# Patient Record
Sex: MALE | Race: ASIAN | NOT HISPANIC OR LATINO | Employment: OTHER | URBAN - METROPOLITAN AREA
[De-identification: names, ages, dates, MRNs, and addresses within clinical notes are randomized per-mention and may not be internally consistent; named-entity substitution may affect disease eponyms.]

---

## 2021-04-27 ENCOUNTER — HOSPITAL ENCOUNTER (EMERGENCY)
Facility: HOSPITAL | Age: 66
Discharge: HOME | End: 2021-04-27
Attending: EMERGENCY MEDICINE
Payer: MEDICARE

## 2021-04-27 VITALS
OXYGEN SATURATION: 95 % | DIASTOLIC BLOOD PRESSURE: 67 MMHG | RESPIRATION RATE: 18 BRPM | HEART RATE: 83 BPM | TEMPERATURE: 99 F | HEIGHT: 64 IN | WEIGHT: 140 LBS | SYSTOLIC BLOOD PRESSURE: 139 MMHG | BODY MASS INDEX: 23.9 KG/M2

## 2021-04-27 DIAGNOSIS — S09.90XA INJURY OF HEAD, INITIAL ENCOUNTER: Primary | ICD-10-CM
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
## 2021-04-27 DIAGNOSIS — S01.81XA LACERATION OF FOREHEAD, INITIAL ENCOUNTER: ICD-10-CM

## 2021-04-27 PROCEDURE — 63600175 PHARM REV CODE 636 W HCPCS: Performed by: EMERGENCY MEDICINE

## 2021-04-27 PROCEDURE — 25000003 PHARM REV CODE 250: Performed by: EMERGENCY MEDICINE

## 2021-04-27 PROCEDURE — 90471 IMMUNIZATION ADMIN: CPT | Performed by: EMERGENCY MEDICINE

## 2021-04-27 PROCEDURE — 99284 EMERGENCY DEPT VISIT MOD MDM: CPT | Mod: 25

## 2021-04-27 PROCEDURE — 90715 TDAP VACCINE 7 YRS/> IM: CPT | Performed by: EMERGENCY MEDICINE

## 2021-04-27 PROCEDURE — 12013 RPR F/E/E/N/L/M 2.6-5.0 CM: CPT

## 2021-04-27 RX ORDER — LIDOCAINE HYDROCHLORIDE AND EPINEPHRINE 10; 10 MG/ML; UG/ML
10 INJECTION, SOLUTION INFILTRATION; PERINEURAL ONCE
Status: COMPLETED | OUTPATIENT
Start: 2021-04-27 | End: 2021-04-27

## 2021-04-27 RX ORDER — SIMVASTATIN 10 MG/1
TABLET, FILM COATED ORAL
COMMUNITY
Start: 2021-02-01 | End: 2024-01-23

## 2021-04-27 RX ORDER — ROSUVASTATIN CALCIUM 20 MG/1
20 TABLET, COATED ORAL NIGHTLY
COMMUNITY
Start: 2021-04-16 | End: 2024-01-23

## 2021-04-27 RX ORDER — ACETAMINOPHEN 500 MG
1000 TABLET ORAL
Status: COMPLETED | OUTPATIENT
Start: 2021-04-27 | End: 2021-04-27

## 2021-04-27 RX ORDER — ATORVASTATIN CALCIUM 20 MG/1
20 TABLET, FILM COATED ORAL DAILY
COMMUNITY
Start: 2021-04-21 | End: 2024-01-23

## 2021-04-27 RX ADMIN — ACETAMINOPHEN 1000 MG: 500 TABLET, FILM COATED ORAL at 08:04

## 2021-04-27 RX ADMIN — CLOSTRIDIUM TETANI TOXOID ANTIGEN (FORMALDEHYDE INACTIVATED), CORYNEBACTERIUM DIPHTHERIAE TOXOID ANTIGEN (FORMALDEHYDE INACTIVATED), BORDETELLA PERTUSSIS TOXOID ANTIGEN (GLUTARALDEHYDE INACTIVATED), BORDETELLA PERTUSSIS FILAMENTOUS HEMAGGLUTININ ANTIGEN (FORMALDEHYDE INACTIVATED), BORDETELLA PERTUSSIS PERTACTIN ANTIGEN, AND BORDETELLA PERTUSSIS FIMBRIAE 2/3 ANTIGEN 0.5 ML: 5; 2; 2.5; 5; 3; 5 INJECTION, SUSPENSION INTRAMUSCULAR at 11:04

## 2021-04-27 RX ADMIN — LIDOCAINE HYDROCHLORIDE,EPINEPHRINE BITARTRATE 10 ML: 10; .01 INJECTION, SOLUTION INFILTRATION; PERINEURAL at 10:04

## 2021-12-27 ENCOUNTER — HOSPITAL ENCOUNTER (EMERGENCY)
Facility: HOSPITAL | Age: 66
Discharge: HOME | End: 2021-12-27
Attending: EMERGENCY MEDICINE
Payer: MEDICARE

## 2021-12-27 VITALS
RESPIRATION RATE: 17 BRPM | HEART RATE: 98 BPM | DIASTOLIC BLOOD PRESSURE: 59 MMHG | SYSTOLIC BLOOD PRESSURE: 122 MMHG | BODY MASS INDEX: 23.22 KG/M2 | OXYGEN SATURATION: 100 % | WEIGHT: 136 LBS | TEMPERATURE: 98 F | HEIGHT: 64 IN

## 2021-12-27 DIAGNOSIS — S92.352A CLOSED FRACTURE OF BASE OF FIFTH METATARSAL BONE OF LEFT FOOT, INITIAL ENCOUNTER: Primary | ICD-10-CM

## 2021-12-27 DIAGNOSIS — M79.673 FOOT PAIN: ICD-10-CM

## 2021-12-27 DIAGNOSIS — S99.929A FOOT INJURY: ICD-10-CM

## 2021-12-27 DIAGNOSIS — M25.579 ANKLE PAIN: ICD-10-CM

## 2021-12-27 PROCEDURE — 99284 EMERGENCY DEPT VISIT MOD MDM: CPT | Mod: 25,ER

## 2021-12-27 PROCEDURE — 29515 APPLICATION SHORT LEG SPLINT: CPT | Mod: LT,ER

## 2021-12-27 RX ORDER — IBUPROFEN 600 MG/1
600 TABLET ORAL EVERY 6 HOURS PRN
Qty: 20 TABLET | Refills: 0 | Status: SHIPPED | OUTPATIENT
Start: 2021-12-27 | End: 2022-01-01

## 2021-12-27 RX ORDER — OXYCODONE AND ACETAMINOPHEN 5; 325 MG/1; MG/1
1 TABLET ORAL EVERY 6 HOURS PRN
Qty: 12 TABLET | Refills: 0 | Status: SHIPPED | OUTPATIENT
Start: 2021-12-27 | End: 2021-12-30

## 2021-12-28 NOTE — DISCHARGE INSTRUCTIONS

## 2021-12-28 NOTE — ED PROVIDER NOTES
Encounter Date: 12/27/2021    SCRIBE #1 NOTE: I, Jonathan Francis, am scribing for, and in the presence of,  JILLIAN Freed. I have scribed the following portions of the note - Other sections scribed: HPI, ROS, PE.       History     Chief Complaint   Patient presents with    Foot Injury     Left foot and ankle pain, bruising noted.  Fall off ladder- approx 2 rungs high onto foot, occurred 1 week ago, not getting better     Eduardo Jones is a 66 y.o. male who presents to the ED for evaluation of left foot pain s/p falling from the second rung of his ladder a week ago. Associated bruising. Reports itchy rash to his left foot onset 2 months ago. Denies discharge, numbness, lower leg pain, LOC, upper leg pain, or other associated symptoms. Pain aggravated when walking and reports difficulty ambulating secondary to pain. Denies hitting head. Denies taking any medications for symptoms. Denies seeing dermatologist for rash.       The history is provided by the patient. No  was used.     Review of patient's allergies indicates:  No Known Allergies  Past Medical History:   Diagnosis Date    Hyperlipidemia      Past Surgical History:   Procedure Laterality Date    ANKLE SURGERY Right     fracture     History reviewed. No pertinent family history.  Social History     Tobacco Use    Smoking status: Never Smoker    Smokeless tobacco: Never Used   Substance Use Topics    Alcohol use: Yes     Comment: sometimes    Drug use: Never     Review of Systems   Constitutional: Negative for chills, fatigue and fever.   HENT: Negative for congestion, ear pain, rhinorrhea and sore throat.    Eyes: Negative for pain, discharge and redness.   Respiratory: Negative for cough and shortness of breath.    Cardiovascular: Negative for chest pain.   Gastrointestinal: Negative for abdominal pain, anal bleeding, blood in stool, constipation, diarrhea, nausea, rectal pain and vomiting.   Genitourinary: Negative for dysuria  and hematuria.   Musculoskeletal: Positive for arthralgias. Negative for back pain, myalgias, neck pain and neck stiffness.   Skin: Positive for color change and rash.   Allergic/Immunologic: Negative for food allergies.   Neurological: Negative for dizziness, syncope, weakness, light-headedness, numbness and headaches.   Psychiatric/Behavioral: Negative for confusion.       Physical Exam     Initial Vitals [12/27/21 1657]   BP Pulse Resp Temp SpO2   (!) 131/57 100 19 98.2 °F (36.8 °C) 97 %      MAP       --         Physical Exam    Nursing note and vitals reviewed.  Constitutional: He appears well-developed.   HENT:   Head: Normocephalic and atraumatic.   Right Ear: External ear normal.   Left Ear: External ear normal.   Nose: Nose normal.   Mouth/Throat: Oropharynx is clear and moist.   Eyes: Conjunctivae and EOM are normal. Pupils are equal, round, and reactive to light.   Neck: Neck supple.   Normal range of motion.  Cardiovascular: Normal rate, regular rhythm, S1 normal, S2 normal and normal heart sounds. Exam reveals no gallop and no friction rub.    No murmur heard.  Pulmonary/Chest: Breath sounds normal. No respiratory distress. He has no wheezes. He has no rhonchi. He has no rales.   Abdominal: Abdomen is soft. There is no abdominal tenderness.   Musculoskeletal:         General: Normal range of motion.      Cervical back: Normal range of motion and neck supple.      Left foot: Tenderness present.      Comments: Bruising to bottom of the left foot with tenderness to the 4th and 5th metatarsal     Neurological: He is alert and oriented to person, place, and time.   Skin: Skin is warm and dry. Capillary refill takes less than 2 seconds. Bruising and rash noted.   Rash as imaged below.    Psychiatric: He has a normal mood and affect. His behavior is normal.         ED Course   Orthopedic Injury    Date/Time: 12/27/2021 9:26 PM  Performed by: Noelle Martin PA-C  Authorized by: Andrew Reddy MD      Location procedure was performed:  [facility] EMERGENCY DEPARTMENT  Injury:     Injury location:  Foot    Location details:  Left foot    Injury type:  Fracture    Fracture type: fifth metatarsal        Pre-procedure assessment:     Neurovascular status: Neurovascularly intact      Distal perfusion: normal      Neurological function: normal      Range of motion: reduced        Selections made in this section will also lock the Injury type section above.:     Immobilization:  Splint    Splint type:  Short leg    Supplies used:  Ortho-Glass  Post-procedure assessment:     Neurovascular status: Neurovascularly intact      Distal perfusion: normal      Neurological function: normal      Range of motion: splinted      Patient tolerance:  Patient tolerated the procedure well with no immediate complications      Labs Reviewed - No data to display       Imaging Results          X-Ray Foot Complete Left (Final result)  Result time 12/27/21 19:13:17    Final result by Lamont Hernandez MD (12/27/21 19:13:17)                 Impression:      Comminuted intra-articular fracture of the proximal base of the proximal and of the 5th metatarsal.    Bimalleolar soft tissue swelling, lateral greater than medial with no additional ankle or foot fractures.      Electronically signed by: Lamont Hernandez  Date:    12/27/2021  Time:    19:13             Narrative:    EXAMINATION:  XR ANKLE COMPLETE 3 VIEW LEFT; XR FOOT COMPLETE 3 VIEW LEFT    CLINICAL HISTORY:  Unspecified injury of unspecified foot, initial encounter    TECHNIQUE:  AP, lateral and oblique views of the left ankle were performed.    COMPARISON:  None    FINDINGS:  There is a comminuted fracture of the proximal base of the 5th metatarsal with intra-articular extension and mild retraction of the fragments.  The remainder of the bones appear intact.  Lisfranc joint appears maintained.    The ankle appears intact with bimalleolar soft tissue swelling, lateral greater than  medial.  The talar dome and plafond appear maintained.  The ankle mortise is intact.                               X-Ray Ankle Complete Left (Final result)  Result time 12/27/21 19:13:17    Final result by Lamont Hernandez MD (12/27/21 19:13:17)                 Impression:      Comminuted intra-articular fracture of the proximal base of the proximal and of the 5th metatarsal.    Bimalleolar soft tissue swelling, lateral greater than medial with no additional ankle or foot fractures.      Electronically signed by: Lamont Hernandez  Date:    12/27/2021  Time:    19:13             Narrative:    EXAMINATION:  XR ANKLE COMPLETE 3 VIEW LEFT; XR FOOT COMPLETE 3 VIEW LEFT    CLINICAL HISTORY:  Unspecified injury of unspecified foot, initial encounter    TECHNIQUE:  AP, lateral and oblique views of the left ankle were performed.    COMPARISON:  None    FINDINGS:  There is a comminuted fracture of the proximal base of the 5th metatarsal with intra-articular extension and mild retraction of the fragments.  The remainder of the bones appear intact.  Lisfranc joint appears maintained.    The ankle appears intact with bimalleolar soft tissue swelling, lateral greater than medial.  The talar dome and plafond appear maintained.  The ankle mortise is intact.                                 Medications - No data to display  Medical Decision Making:   History:   Old Medical Records: I decided to obtain old medical records.  Clinical Tests:   Radiological Study: Ordered and Reviewed  ED Management:  [age] year male presenting for traumatic left foot pain.  Exam above.  xray Remarkable for comminuted intra-articular fracture of the proximal base of the proximal 5th metatarsal. Independently reviewed and interpreted x-rays with Dr. Reddy. No neurovascular deficits. Splint applied per procedure note. Follow up with ortho in 1-2 days. Return to ER for worsening symptoms or as needed          Scribe Attestation:   Scribe #1: I  performed the above scribed service and the documentation accurately describes the services I performed. I attest to the accuracy of the note.               Scribe attestation: I, MEKHI Randle , personally performed the services described in this documentation.  All medical record entries made by the scribe were at my direction and in my presence.  I have reviewed the chart and agree that the record reflects my personal performance and is accurate and complete.    Clinical Impression:   Final diagnoses:  [S99.929A] Foot injury  [M79.673] Foot pain  [M25.579] Ankle pain  [S92.352A] Closed fracture of base of fifth metatarsal bone of left foot, initial encounter (Primary)          ED Disposition Condition    Discharge Stable        ED Prescriptions     Medication Sig Dispense Start Date End Date Auth. Provider    ibuprofen (ADVIL,MOTRIN) 600 MG tablet Take 1 tablet (600 mg total) by mouth every 6 (six) hours as needed for Pain. 20 tablet 12/27/2021 1/1/2022 Noelle Martin PA-C    oxyCODONE-acetaminophen (PERCOCET) 5-325 mg per tablet Take 1 tablet by mouth every 6 (six) hours as needed for Pain. 12 tablet 12/27/2021 12/30/2021 Noelle Martin PA-C        Follow-up Information     Follow up With Specialties Details Why Contact Info    Ada Lyons MD Family Medicine Schedule an appointment as soon as possible for a visit in 2 days for follow up [address] [facility] [address] [phone]      [facility] ED Emergency Medicine Go to  If symptoms worsen, As needed [address] [facility] [address] [phone]    Bijal Franco DPM (podiatry)  Schedule an appointment as soon as possible for a visit in 2 days For wound re-check [address] [address] [phone]    Jasper Nicholas MD Orthopedic Surgery, Hand Surgery Schedule an appointment as soon as possible for a visit  for orthopedics follow up [address] [facility] [address] [phone]       Mehrdad Raines MD Orthopedic Surgery Schedule an appointment as soon as possible for a visit  for follow up with orthopedics [address] [facility] [address]  [facility] [address]  [phone]             Noelle Martin PA-C  12/27/21 1909

## 2022-12-23 ENCOUNTER — PES CALL (OUTPATIENT)
Dept: ADMINISTRATIVE | Facility: CLINIC | Age: 67
End: 2022-12-23
Payer: MEDICARE

## 2023-09-07 ENCOUNTER — PATIENT OUTREACH (OUTPATIENT)
Dept: ADMINISTRATIVE | Facility: HOSPITAL | Age: 68
End: 2023-09-07
Payer: MEDICARE

## 2024-01-23 ENCOUNTER — HOSPITAL ENCOUNTER (OUTPATIENT)
Facility: HOSPITAL | Age: 69
Discharge: HOME | End: 2024-01-24
Attending: EMERGENCY MEDICINE | Admitting: HOSPITALIST
Payer: MEDICARE

## 2024-01-23 DIAGNOSIS — E87.6 HYPOKALEMIA: ICD-10-CM
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
## 2024-01-23 DIAGNOSIS — R07.9 CHEST PAIN: Primary | ICD-10-CM

## 2024-01-23 DIAGNOSIS — R09.02 HYPOXIA: ICD-10-CM

## 2024-01-23 DIAGNOSIS — M25.551 RIGHT HIP PAIN: ICD-10-CM

## 2024-01-23 DIAGNOSIS — R07.9 CHEST PAIN AT REST: ICD-10-CM

## 2024-01-23 PROBLEM — E78.5 HYPERLIPIDEMIA: Status: ACTIVE | Noted: 2024-01-23

## 2024-01-23 PROBLEM — J96.01 ACUTE RESPIRATORY FAILURE WITH HYPOXIA: Status: ACTIVE | Noted: 2024-01-23

## 2024-01-23 LAB
ALBUMIN SERPL BCP-MCNC: 3.7 G/DL (ref 3.5–5.2)
ALLENS TEST: ABNORMAL
ALP SERPL-CCNC: 48 U/L (ref 55–135)
ALT SERPL W/O P-5'-P-CCNC: 24 U/L (ref 10–44)
ANION GAP SERPL CALC-SCNC: 9 MMOL/L (ref 8–16)
ASCENDING AORTA: 3.04 CM
AST SERPL-CCNC: 26 U/L (ref 10–40)
AV INDEX (PROSTH): 0.95
AV MEAN GRADIENT: 4 MMHG
AV PEAK GRADIENT: 7 MMHG
AV VALVE AREA BY VELOCITY RATIO: 3.71 CM²
AV VALVE AREA: 3.82 CM²
AV VELOCITY RATIO: 0.93
BASOPHILS # BLD AUTO: 0.06 K/UL (ref 0–0.2)
BASOPHILS NFR BLD: 0.8 % (ref 0–1.9)
BILIRUB SERPL-MCNC: 0.9 MG/DL (ref 0.1–1)
BNP SERPL-MCNC: 32 PG/ML (ref 0–99)
BSA FOR ECHO PROCEDURE: 1.69 M2
BUN SERPL-MCNC: 13 MG/DL (ref 8–23)
CALCIUM SERPL-MCNC: 8.4 MG/DL (ref 8.7–10.5)
CHLORIDE SERPL-SCNC: 107 MMOL/L (ref 95–110)
CHOLEST SERPL-MCNC: 168 MG/DL (ref 120–199)
CHOLEST/HDLC SERPL: 2.1 {RATIO} (ref 2–5)
CO2 SERPL-SCNC: 23 MMOL/L (ref 23–29)
CREAT SERPL-MCNC: 0.9 MG/DL (ref 0.5–1.4)
CTP QC/QA: YES
CTP QC/QA: YES
CV ECHO LV RWT: 0.48 CM
CV STRESS BASE HR: 86 BPM
D DIMER PPP IA.FEU-MCNC: 0.35 MG/L FEU
DELSYS: ABNORMAL
DIASTOLIC BLOOD PRESSURE: 60 MMHG
DIFFERENTIAL METHOD BLD: ABNORMAL
DOP CALC AO PEAK VEL: 1.35 M/S
DOP CALC AO VTI: 27.1 CM
DOP CALC LVOT AREA: 4 CM2
DOP CALC LVOT DIAMETER: 2.26 CM
DOP CALC LVOT PEAK VEL: 1.25 M/S
DOP CALC LVOT STROKE VOLUME: 103.44 CM3
DOP CALCLVOT PEAK VEL VTI: 25.8 CM
E WAVE DECELERATION TIME: 157.52 MSEC
E/A RATIO: 0.88
E/E' RATIO: 9.47 M/S
ECHO LV POSTERIOR WALL: 1.04 CM (ref 0.6–1.1)
EOSINOPHIL # BLD AUTO: 0.4 K/UL (ref 0–0.5)
EOSINOPHIL NFR BLD: 4.9 % (ref 0–8)
ERYTHROCYTE [DISTWIDTH] IN BLOOD BY AUTOMATED COUNT: 13.2 % (ref 11.5–14.5)
EST. GFR  (NO RACE VARIABLE): >60 ML/MIN/1.73 M^2
FRACTIONAL SHORTENING: 38 % (ref 28–44)
GLUCOSE SERPL-MCNC: 104 MG/DL (ref 70–110)
HCO3 UR-SCNC: 22 MMOL/L (ref 24–28)
HCT VFR BLD AUTO: 45.1 % (ref 40–54)
HDLC SERPL-MCNC: 81 MG/DL (ref 40–75)
HDLC SERPL: 48.2 % (ref 20–50)
HGB BLD-MCNC: 15 G/DL (ref 14–18)
IMM GRANULOCYTES # BLD AUTO: 0.04 K/UL (ref 0–0.04)
IMM GRANULOCYTES NFR BLD AUTO: 0.5 % (ref 0–0.5)
INTERVENTRICULAR SEPTUM: 1.02 CM (ref 0.6–1.1)
IVC DIAMETER: 1.48 CM
IVRT: 95.15 MSEC
LA MAJOR: 4.93 CM
LA MINOR: 4.87 CM
LA WIDTH: 3.4 CM
LDLC SERPL CALC-MCNC: 38.2 MG/DL (ref 63–159)
LEFT ATRIUM SIZE: 3.12 CM
LEFT ATRIUM VOLUME INDEX: 26.3 ML/M2
LEFT ATRIUM VOLUME: 44.18 CM3
LEFT INTERNAL DIMENSION IN SYSTOLE: 2.7 CM (ref 2.1–4)
LEFT VENTRICLE DIASTOLIC VOLUME INDEX: 51.13 ML/M2
LEFT VENTRICLE DIASTOLIC VOLUME: 85.89 ML
LEFT VENTRICLE MASS INDEX: 90 G/M2
LEFT VENTRICLE SYSTOLIC VOLUME INDEX: 16.1 ML/M2
LEFT VENTRICLE SYSTOLIC VOLUME: 27.03 ML
LEFT VENTRICULAR INTERNAL DIMENSION IN DIASTOLE: 4.36 CM (ref 3.5–6)
LEFT VENTRICULAR MASS: 151.8 G
LV LATERAL E/E' RATIO: 8.88 M/S
LV SEPTAL E/E' RATIO: 10.14 M/S
LVOT MG: 3.64 MMHG
LVOT MV: 0.9 CM/S
LYMPHOCYTES # BLD AUTO: 2.6 K/UL (ref 1–4.8)
LYMPHOCYTES NFR BLD: 33.7 % (ref 18–48)
MAGNESIUM SERPL-MCNC: 2 MG/DL (ref 1.6–2.6)
MCH RBC QN AUTO: 31.4 PG (ref 27–31)
MCHC RBC AUTO-ENTMCNC: 33.3 G/DL (ref 32–36)
MCV RBC AUTO: 95 FL (ref 82–98)
MONOCYTES # BLD AUTO: 0.5 K/UL (ref 0.3–1)
MONOCYTES NFR BLD: 6.8 % (ref 4–15)
MV PEAK A VEL: 0.81 M/S
MV PEAK E VEL: 0.71 M/S
MV STENOSIS PRESSURE HALF TIME: 45.68 MS
MV VALVE AREA P 1/2 METHOD: 4.82 CM2
NEUTROPHILS # BLD AUTO: 4.1 K/UL (ref 1.8–7.7)
NEUTROPHILS NFR BLD: 53.3 % (ref 38–73)
NONHDLC SERPL-MCNC: 87 MG/DL
NRBC BLD-RTO: 0 /100 WBC
NUC STRESS EJECTION FRACTION: 79 %
OHS CV CPX 85 PERCENT MAX PREDICTED HEART RATE MALE: 128
OHS CV CPX MAX PREDICTED HEART RATE: 151
OHS CV CPX PATIENT IS FEMALE: 0
OHS CV CPX PATIENT IS MALE: 1
OHS CV CPX PEAK DIASTOLIC BLOOD PRESSURE: 55 MMHG
OHS CV CPX PEAK HEAR RATE: 107 BPM
OHS CV CPX PEAK RATE PRESSURE PRODUCT: NORMAL
OHS CV CPX PEAK SYSTOLIC BLOOD PRESSURE: 148 MMHG
OHS CV CPX PERCENT MAX PREDICTED HEART RATE ACHIEVED: 71
OHS CV CPX RATE PRESSURE PRODUCT PRESENTING: NORMAL
PCO2 BLDA: 34.6 MMHG (ref 35–45)
PH SMN: 7.41 [PH] (ref 7.35–7.45)
PHOSPHATE SERPL-MCNC: 3.1 MG/DL (ref 2.7–4.5)
PISA TR MAX VEL: 2.39 M/S
PLATELET # BLD AUTO: 135 K/UL (ref 150–450)
PMV BLD AUTO: 9.6 FL (ref 9.2–12.9)
PO2 BLDA: 31 MMHG (ref 40–60)
POC BE: -2 MMOL/L
POC MOLECULAR INFLUENZA A AGN: NEGATIVE
POC MOLECULAR INFLUENZA B AGN: NEGATIVE
POC SATURATED O2: 61 % (ref 95–100)
POC TCO2: 23 MMOL/L (ref 24–29)
POTASSIUM SERPL-SCNC: 3.3 MMOL/L (ref 3.5–5.1)
PROT SERPL-MCNC: 7.1 G/DL (ref 6–8.4)
PULM VEIN S/D RATIO: 1.36
PV PEAK D VEL: 0.45 M/S
PV PEAK GRADIENT: 4 MMHG
PV PEAK S VEL: 0.61 M/S
PV PEAK VELOCITY: 1.05 M/S
RA MAJOR: 4.6 CM
RA PRESSURE ESTIMATED: 3 MMHG
RA WIDTH: 2.8 CM
RBC # BLD AUTO: 4.77 M/UL (ref 4.6–6.2)
RIGHT VENTRICULAR END-DIASTOLIC DIMENSION: 3.01 CM
RV TB RVSP: 5 MMHG
RV TISSUE DOPPLER FREE WALL SYSTOLIC VELOCITY 1 (APICAL 4 CHAMBER VIEW): 18.76 CM/S
SAMPLE: ABNORMAL
SARS-COV-2 RDRP RESP QL NAA+PROBE: NEGATIVE
SINUS: 3.44 CM
SITE: ABNORMAL
SODIUM SERPL-SCNC: 139 MMOL/L (ref 136–145)
STJ: 2.72 CM
SYSTOLIC BLOOD PRESSURE: 146 MMHG
T4 FREE SERPL-MCNC: 0.92 NG/DL (ref 0.71–1.51)
TDI LATERAL: 0.08 M/S
TDI SEPTAL: 0.07 M/S
TDI: 0.08 M/S
TR MAX PG: 23 MMHG
TRICUSPID ANNULAR PLANE SYSTOLIC EXCURSION: 2.94 CM
TRIGL SERPL-MCNC: 244 MG/DL (ref 30–150)
TROPONIN I SERPL DL<=0.01 NG/ML-MCNC: 0.01 NG/ML (ref 0–0.03)
TROPONIN I SERPL DL<=0.01 NG/ML-MCNC: 0.01 NG/ML (ref 0–0.03)
TROPONIN I SERPL DL<=0.01 NG/ML-MCNC: <0.006 NG/ML (ref 0–0.03)
TSH SERPL DL<=0.005 MIU/L-ACNC: 4.38 UIU/ML (ref 0.4–4)
TV PEAK GRADIENT: 1 MMHG
TV REST PULMONARY ARTERY PRESSURE: 26 MMHG
WBC # BLD AUTO: 7.75 K/UL (ref 3.9–12.7)
Z-SCORE OF LEFT VENTRICULAR DIMENSION IN END DIASTOLE: -0.72
Z-SCORE OF LEFT VENTRICULAR DIMENSION IN END SYSTOLE: -0.58

## 2024-01-23 PROCEDURE — 99204 OFFICE O/P NEW MOD 45 MIN: CPT | Mod: ,,, | Performed by: INTERNAL MEDICINE

## 2024-01-23 PROCEDURE — 96375 TX/PRO/DX INJ NEW DRUG ADDON: CPT

## 2024-01-23 PROCEDURE — C9113 INJ PANTOPRAZOLE SODIUM, VIA: HCPCS | Performed by: PHYSICIAN ASSISTANT

## 2024-01-23 PROCEDURE — 83880 ASSAY OF NATRIURETIC PEPTIDE: CPT | Performed by: PHYSICIAN ASSISTANT

## 2024-01-23 PROCEDURE — 63600175 PHARM REV CODE 636 W HCPCS: Performed by: EMERGENCY MEDICINE

## 2024-01-23 PROCEDURE — 85025 COMPLETE CBC W/AUTO DIFF WBC: CPT | Performed by: PHYSICIAN ASSISTANT

## 2024-01-23 PROCEDURE — 85379 FIBRIN DEGRADATION QUANT: CPT | Performed by: PHYSICIAN ASSISTANT

## 2024-01-23 PROCEDURE — 84100 ASSAY OF PHOSPHORUS: CPT | Performed by: PHYSICIAN ASSISTANT

## 2024-01-23 PROCEDURE — 96374 THER/PROPH/DIAG INJ IV PUSH: CPT

## 2024-01-23 PROCEDURE — G0378 HOSPITAL OBSERVATION PER HR: HCPCS

## 2024-01-23 PROCEDURE — 84484 ASSAY OF TROPONIN QUANT: CPT | Mod: 91 | Performed by: NURSE PRACTITIONER

## 2024-01-23 PROCEDURE — 84439 ASSAY OF FREE THYROXINE: CPT | Performed by: PHYSICIAN ASSISTANT

## 2024-01-23 PROCEDURE — 94640 AIRWAY INHALATION TREATMENT: CPT

## 2024-01-23 PROCEDURE — 25000003 PHARM REV CODE 250: Performed by: PHYSICIAN ASSISTANT

## 2024-01-23 PROCEDURE — 27000221 HC OXYGEN, UP TO 24 HOURS

## 2024-01-23 PROCEDURE — 99285 EMERGENCY DEPT VISIT HI MDM: CPT | Mod: 25

## 2024-01-23 PROCEDURE — 25000242 PHARM REV CODE 250 ALT 637 W/ HCPCS: Performed by: EMERGENCY MEDICINE

## 2024-01-23 PROCEDURE — 63600175 PHARM REV CODE 636 W HCPCS: Performed by: INTERNAL MEDICINE

## 2024-01-23 PROCEDURE — 25000242 PHARM REV CODE 250 ALT 637 W/ HCPCS: Performed by: NURSE PRACTITIONER

## 2024-01-23 PROCEDURE — 84484 ASSAY OF TROPONIN QUANT: CPT | Performed by: PHYSICIAN ASSISTANT

## 2024-01-23 PROCEDURE — 93005 ELECTROCARDIOGRAM TRACING: CPT

## 2024-01-23 PROCEDURE — 82803 BLOOD GASES ANY COMBINATION: CPT

## 2024-01-23 PROCEDURE — 63600175 PHARM REV CODE 636 W HCPCS: Performed by: PHYSICIAN ASSISTANT

## 2024-01-23 PROCEDURE — 99900035 HC TECH TIME PER 15 MIN (STAT)

## 2024-01-23 PROCEDURE — 84443 ASSAY THYROID STIM HORMONE: CPT | Performed by: PHYSICIAN ASSISTANT

## 2024-01-23 PROCEDURE — 93010 ELECTROCARDIOGRAM REPORT: CPT | Mod: ,,, | Performed by: INTERNAL MEDICINE

## 2024-01-23 PROCEDURE — 63600175 PHARM REV CODE 636 W HCPCS: Performed by: NURSE PRACTITIONER

## 2024-01-23 PROCEDURE — 87502 INFLUENZA DNA AMP PROBE: CPT

## 2024-01-23 PROCEDURE — 94760 N-INVAS EAR/PLS OXIMETRY 1: CPT | Mod: XB

## 2024-01-23 PROCEDURE — 83735 ASSAY OF MAGNESIUM: CPT | Performed by: PHYSICIAN ASSISTANT

## 2024-01-23 PROCEDURE — 96376 TX/PRO/DX INJ SAME DRUG ADON: CPT | Mod: 59

## 2024-01-23 PROCEDURE — C9113 INJ PANTOPRAZOLE SODIUM, VIA: HCPCS | Performed by: NURSE PRACTITIONER

## 2024-01-23 PROCEDURE — 80061 LIPID PANEL: CPT | Performed by: PHYSICIAN ASSISTANT

## 2024-01-23 PROCEDURE — 80053 COMPREHEN METABOLIC PANEL: CPT | Performed by: PHYSICIAN ASSISTANT

## 2024-01-23 PROCEDURE — 87635 SARS-COV-2 COVID-19 AMP PRB: CPT | Performed by: PHYSICIAN ASSISTANT

## 2024-01-23 PROCEDURE — 94644 CONT INHLJ TX 1ST HOUR: CPT

## 2024-01-23 RX ORDER — IPRATROPIUM BROMIDE AND ALBUTEROL SULFATE 2.5; .5 MG/3ML; MG/3ML
3 SOLUTION RESPIRATORY (INHALATION)
Status: DISCONTINUED | OUTPATIENT
Start: 2024-01-23 | End: 2024-01-23

## 2024-01-23 RX ORDER — CHLORHEXIDINE GLUCONATE ORAL RINSE 1.2 MG/ML
15 SOLUTION DENTAL 2 TIMES DAILY
COMMUNITY
Start: 2024-01-03

## 2024-01-23 RX ORDER — ASPIRIN 81 MG/1
81 TABLET ORAL DAILY
Status: DISCONTINUED | OUTPATIENT
Start: 2024-01-23 | End: 2024-01-23

## 2024-01-23 RX ORDER — SODIUM CHLORIDE 0.9 % (FLUSH) 0.9 %
10 SYRINGE (ML) INJECTION
Status: DISCONTINUED | OUTPATIENT
Start: 2024-01-23 | End: 2024-01-24 | Stop reason: HOSPADM

## 2024-01-23 RX ORDER — ALLOPURINOL 100 MG/1
300 TABLET ORAL DAILY
Status: DISCONTINUED | OUTPATIENT
Start: 2024-01-24 | End: 2024-01-24 | Stop reason: HOSPADM

## 2024-01-23 RX ORDER — ACETAMINOPHEN 325 MG/1
650 TABLET ORAL EVERY 4 HOURS PRN
Status: DISCONTINUED | OUTPATIENT
Start: 2024-01-23 | End: 2024-01-24 | Stop reason: HOSPADM

## 2024-01-23 RX ORDER — ATORVASTATIN CALCIUM 20 MG/1
20 TABLET, FILM COATED ORAL DAILY
COMMUNITY

## 2024-01-23 RX ORDER — FLUTICASONE PROPIONATE 50 MCG
2 SPRAY, SUSPENSION (ML) NASAL DAILY PRN
COMMUNITY
Start: 2023-11-07

## 2024-01-23 RX ORDER — NAPROXEN 500 MG/1
500 TABLET ORAL 2 TIMES DAILY PRN
Status: ON HOLD | COMMUNITY
Start: 2023-11-06 | End: 2024-01-24 | Stop reason: HOSPADM

## 2024-01-23 RX ORDER — PANTOPRAZOLE SODIUM 40 MG/10ML
40 INJECTION, POWDER, LYOPHILIZED, FOR SOLUTION INTRAVENOUS
Status: COMPLETED | OUTPATIENT
Start: 2024-01-23 | End: 2024-01-23

## 2024-01-23 RX ORDER — ICOSAPENT ETHYL 1000 MG/1
2 CAPSULE ORAL 2 TIMES DAILY
COMMUNITY
Start: 2023-10-20

## 2024-01-23 RX ORDER — ALUMINUM HYDROXIDE, MAGNESIUM HYDROXIDE, AND SIMETHICONE 1200; 120; 1200 MG/30ML; MG/30ML; MG/30ML
30 SUSPENSION ORAL ONCE
Status: COMPLETED | OUTPATIENT
Start: 2024-01-23 | End: 2024-01-23

## 2024-01-23 RX ORDER — IPRATROPIUM BROMIDE AND ALBUTEROL SULFATE 2.5; .5 MG/3ML; MG/3ML
3 SOLUTION RESPIRATORY (INHALATION) EVERY 4 HOURS PRN
Status: DISCONTINUED | OUTPATIENT
Start: 2024-01-23 | End: 2024-01-24 | Stop reason: HOSPADM

## 2024-01-23 RX ORDER — ASPIRIN 81 MG/1
81 TABLET ORAL DAILY
Status: DISCONTINUED | OUTPATIENT
Start: 2024-01-24 | End: 2024-01-24 | Stop reason: HOSPADM

## 2024-01-23 RX ORDER — METHOCARBAMOL 750 MG/1
750 TABLET, FILM COATED ORAL
Status: COMPLETED | OUTPATIENT
Start: 2024-01-23 | End: 2024-01-23

## 2024-01-23 RX ORDER — CLOTRIMAZOLE AND BETAMETHASONE DIPROPIONATE 10; .64 MG/G; MG/G
CREAM TOPICAL 2 TIMES DAILY PRN
COMMUNITY
Start: 2023-11-15

## 2024-01-23 RX ORDER — ALBUTEROL SULFATE 2.5 MG/.5ML
10 SOLUTION RESPIRATORY (INHALATION)
Status: COMPLETED | OUTPATIENT
Start: 2024-01-23 | End: 2024-01-23

## 2024-01-23 RX ORDER — IPRATROPIUM BROMIDE AND ALBUTEROL SULFATE 2.5; .5 MG/3ML; MG/3ML
3 SOLUTION RESPIRATORY (INHALATION) EVERY 4 HOURS
Status: DISCONTINUED | OUTPATIENT
Start: 2024-01-23 | End: 2024-01-23

## 2024-01-23 RX ORDER — ATORVASTATIN CALCIUM 40 MG/1
40 TABLET, FILM COATED ORAL NIGHTLY
Status: DISCONTINUED | OUTPATIENT
Start: 2024-01-23 | End: 2024-01-23

## 2024-01-23 RX ORDER — TRIAMCINOLONE ACETONIDE 1 MG/G
PASTE DENTAL 2 TIMES DAILY
COMMUNITY
Start: 2023-10-09 | End: 2024-01-23

## 2024-01-23 RX ORDER — IPRATROPIUM BROMIDE 0.5 MG/2.5ML
1 SOLUTION RESPIRATORY (INHALATION)
Status: COMPLETED | OUTPATIENT
Start: 2024-01-23 | End: 2024-01-23

## 2024-01-23 RX ORDER — BENZONATATE 100 MG/1
100-200 CAPSULE ORAL 3 TIMES DAILY PRN
COMMUNITY
Start: 2023-12-19 | End: 2024-01-23

## 2024-01-23 RX ORDER — DEXAMETHASONE SODIUM PHOSPHATE 4 MG/ML
8 INJECTION, SOLUTION INTRA-ARTICULAR; INTRALESIONAL; INTRAMUSCULAR; INTRAVENOUS; SOFT TISSUE
Status: COMPLETED | OUTPATIENT
Start: 2024-01-23 | End: 2024-01-23

## 2024-01-23 RX ORDER — ALLOPURINOL 300 MG/1
300 TABLET ORAL DAILY
COMMUNITY
Start: 2024-01-07

## 2024-01-23 RX ORDER — ASPIRIN 325 MG
325 TABLET ORAL
Status: COMPLETED | OUTPATIENT
Start: 2024-01-23 | End: 2024-01-23

## 2024-01-23 RX ORDER — REGADENOSON 0.08 MG/ML
0.4 INJECTION, SOLUTION INTRAVENOUS ONCE
Status: COMPLETED | OUTPATIENT
Start: 2024-01-23 | End: 2024-01-23

## 2024-01-23 RX ORDER — PANTOPRAZOLE SODIUM 40 MG/10ML
40 INJECTION, POWDER, LYOPHILIZED, FOR SOLUTION INTRAVENOUS DAILY
Status: DISCONTINUED | OUTPATIENT
Start: 2024-01-23 | End: 2024-01-24 | Stop reason: HOSPADM

## 2024-01-23 RX ADMIN — IPRATROPIUM BROMIDE 1 MG: 0.5 SOLUTION RESPIRATORY (INHALATION) at 07:01

## 2024-01-23 RX ADMIN — ALUMINUM HYDROXIDE, MAGNESIUM HYDROXIDE, AND DIMETHICONE 30 ML: 200; 20; 200 SUSPENSION ORAL at 06:01

## 2024-01-23 RX ADMIN — PANTOPRAZOLE SODIUM 40 MG: 40 INJECTION, POWDER, FOR SOLUTION INTRAVENOUS at 06:01

## 2024-01-23 RX ADMIN — PANTOPRAZOLE SODIUM 40 MG: 40 INJECTION, POWDER, FOR SOLUTION INTRAVENOUS at 07:01

## 2024-01-23 RX ADMIN — ALBUTEROL SULFATE 10 MG: 2.5 SOLUTION RESPIRATORY (INHALATION) at 07:01

## 2024-01-23 RX ADMIN — ASPIRIN 325 MG ORAL TABLET 325 MG: 325 PILL ORAL at 05:01

## 2024-01-23 RX ADMIN — POTASSIUM BICARBONATE 50 MEQ: 977.5 TABLET, EFFERVESCENT ORAL at 06:01

## 2024-01-23 RX ADMIN — METHOCARBAMOL 750 MG: 750 TABLET ORAL at 05:01

## 2024-01-23 RX ADMIN — DEXAMETHASONE SODIUM PHOSPHATE 8 MG: 4 INJECTION INTRA-ARTICULAR; INTRALESIONAL; INTRAMUSCULAR; INTRAVENOUS; SOFT TISSUE at 07:01

## 2024-01-23 RX ADMIN — IPRATROPIUM BROMIDE AND ALBUTEROL SULFATE 3 ML: 2.5; .5 SOLUTION RESPIRATORY (INHALATION) at 11:01

## 2024-01-23 RX ADMIN — REGADENOSON 0.4 MG: 0.08 INJECTION, SOLUTION INTRAVENOUS at 10:01

## 2024-01-23 NOTE — SUBJECTIVE & OBJECTIVE
Past Medical History:   Diagnosis Date    Hyperlipidemia        Past Surgical History:   Procedure Laterality Date    ANKLE SURGERY Right     fracture       Review of patient's allergies indicates:  No Known Allergies    No current facility-administered medications on file prior to encounter.     Current Outpatient Medications on File Prior to Encounter   Medication Sig    allopurinoL (ZYLOPRIM) 300 MG tablet Take 300 mg by mouth once daily.    atorvastatin (LIPITOR) 20 MG tablet Take 20 mg by mouth once daily.    chlorhexidine (PERIDEX) 0.12 % solution 15 mLs 2 (two) times daily.    clotrimazole-betamethasone 1-0.05% (LOTRISONE) cream Apply topically 2 (two) times daily as needed.    fluticasone propionate (FLONASE) 50 mcg/actuation nasal spray 2 sprays by Each Nostril route daily as needed.    naproxen (NAPROSYN) 500 MG tablet Take 500 mg by mouth 2 (two) times daily as needed.    VASCEPA 1 gram Cap Take 2 capsules by mouth 2 (two) times daily.    [DISCONTINUED] atorvastatin (LIPITOR) 20 MG tablet Take 20 mg by mouth once daily.    [DISCONTINUED] benzonatate (TESSALON) 100 MG capsule Take 100-200 mg by mouth 3 (three) times daily as needed.    [DISCONTINUED] rosuvastatin (CRESTOR) 20 MG tablet Take 20 mg by mouth nightly.    [DISCONTINUED] simvastatin (ZOCOR) 10 MG tablet SMARTSI Tablet(s) By Mouth Every Evening    [DISCONTINUED] triamcinolone acetonide 0.1% (KENALOG) 0.1 % paste 2 (two) times daily.     Family History    None       Tobacco Use    Smoking status: Never    Smokeless tobacco: Never   Substance and Sexual Activity    Alcohol use: Yes     Comment: sometimes    Drug use: Never    Sexual activity: Not on file     Review of Systems   Constitutional:  Negative for activity change, appetite change, chills, diaphoresis and fatigue.   HENT: Negative.     Respiratory:  Positive for chest tightness and shortness of breath. Negative for cough, choking, wheezing and stridor.    Cardiovascular:  Positive for  Take 10 mg (2 Pills) of oxycodone every 4 hours for pain  Set an alarm and have someone wake you to make sure you stay on stop of your medication   You should have enough pain medication to get you through to Monday,  Your schedule is as follows: 4:30PM, 7:30 PM  10:30 PM, (Sunday)1:30 AM, 4:30 AM, 7:30 AM, 10:30 AM, 1:30PM:4:30PM, etc  Take colace or senna for constipation  Call Orthopedics on Monday for follow up  Ice the affected area as tolerated chest pain.   Gastrointestinal: Negative.    Genitourinary: Negative.    Musculoskeletal: Negative.    Skin: Negative.    Psychiatric/Behavioral: Negative.     Objective:     Vital Signs (Most Recent):  Temp: 98.1 °F (36.7 °C) (01/23/24 0343)  Pulse: 91 (01/23/24 0832)  Resp: 19 (01/23/24 0832)  BP: 104/63 (01/23/24 0832)  SpO2: 95 % (01/23/24 0830) Vital Signs (24h Range):  Temp:  [98.1 °F (36.7 °C)] 98.1 °F (36.7 °C)  Pulse:  [72-91] 91  Resp:  [18-22] 19  SpO2:  [89 %-96 %] 95 %  BP: (104-142)/(55-67) 104/63     Weight: 63.5 kg (140 lb)  Body mass index is 24.03 kg/m².     Physical Exam  Constitutional:       Appearance: Normal appearance. He is not ill-appearing.   HENT:      Head: Atraumatic.      Nose: Nose normal.   Cardiovascular:      Rate and Rhythm: Normal rate and regular rhythm.   Pulmonary:      Comments: diminished  Abdominal:      Palpations: Abdomen is soft.   Musculoskeletal:      Cervical back: Normal range of motion.      Right lower leg: No edema.      Left lower leg: No edema.   Skin:     General: Skin is warm and dry.   Neurological:      General: No focal deficit present.      Mental Status: He is alert and oriented to person, place, and time.            Significant Labs: All pertinent labs within the past 24 hours have been reviewed.    Significant Imaging: I have reviewed all pertinent imaging results/findings within the past 24 hours.

## 2024-01-23 NOTE — HPI
HPI: Eduardo Jones is a 68-year-old male with significant history for gout, hyperlipidemia, remote smoker quit 20 years ago -40 year pack smoker who drove himself to the ED for non radiating mid sternal chest tightness associated with shortness of breath and palpitation heart rate 120 last night.  For the past 7 days patient has been experience midsternal chest tightness the last about a minute and resolved with rest.  Symptoms lasted longer and more intense last night; thus, drove himself to the ED. he fell in on his right hip while working in the garden yesterday.  He denies hitting his chest or head.  No previous history of MI or stroke.  No family history of MI or stroke.  He has not clear if activity worsened symptoms but did state symptoms resolved with rest. Denies wheezing, recent URI, headaches, dizziness, change in vision,  diaphoresis, orthopnea, PND, abdominal pain, nausea, vomiting, or extremities weakness/numbness. Denies recent sick contacts, travel, or injury. No new physical limitation in the past month.      In ED EKG normal sinus rhythm . Troponin negative x 2. BNP normal  Noted to be 85-88 on room air with activity.  States Right hip and lower back pain.   D dimer negative.  X ray right hip no fractures.  Received maloxx, dexamethasone, albuterol/atrovent neb , protonix in ED.     Currently denies CP or SOB  Denies prior CAD

## 2024-01-23 NOTE — SUBJECTIVE & OBJECTIVE
Past Medical History:   Diagnosis Date    Hyperlipidemia        Past Surgical History:   Procedure Laterality Date    ANKLE SURGERY Right     fracture       Review of patient's allergies indicates:  No Known Allergies    No current facility-administered medications on file prior to encounter.     Current Outpatient Medications on File Prior to Encounter   Medication Sig    allopurinoL (ZYLOPRIM) 300 MG tablet Take 300 mg by mouth once daily.    atorvastatin (LIPITOR) 20 MG tablet Take 20 mg by mouth once daily.    chlorhexidine (PERIDEX) 0.12 % solution 15 mLs 2 (two) times daily.    clotrimazole-betamethasone 1-0.05% (LOTRISONE) cream Apply topically 2 (two) times daily as needed.    fluticasone propionate (FLONASE) 50 mcg/actuation nasal spray 2 sprays by Each Nostril route daily as needed.    naproxen (NAPROSYN) 500 MG tablet Take 500 mg by mouth 2 (two) times daily as needed.    VASCEPA 1 gram Cap Take 2 capsules by mouth 2 (two) times daily.    [DISCONTINUED] atorvastatin (LIPITOR) 20 MG tablet Take 20 mg by mouth once daily.    [DISCONTINUED] benzonatate (TESSALON) 100 MG capsule Take 100-200 mg by mouth 3 (three) times daily as needed.    [DISCONTINUED] rosuvastatin (CRESTOR) 20 MG tablet Take 20 mg by mouth nightly.    [DISCONTINUED] simvastatin (ZOCOR) 10 MG tablet SMARTSI Tablet(s) By Mouth Every Evening    [DISCONTINUED] triamcinolone acetonide 0.1% (KENALOG) 0.1 % paste 2 (two) times daily.     Family History    None       Tobacco Use    Smoking status: Never    Smokeless tobacco: Never   Substance and Sexual Activity    Alcohol use: Yes     Comment: sometimes    Drug use: Never    Sexual activity: Not on file     Review of Systems   Constitutional: Negative for decreased appetite.   HENT:  Negative for ear discharge.    Eyes:  Negative for blurred vision.   Endocrine: Negative for polyphagia.   Skin:  Negative for nail changes.   Genitourinary:  Negative for bladder incontinence.   Neurological:   Negative for aphonia.   Psychiatric/Behavioral:  Negative for hallucinations.    Allergic/Immunologic: Negative for hives.     Objective:     Vital Signs (Most Recent):  Temp: 98.1 °F (36.7 °C) (01/23/24 0343)  Pulse: 91 (01/23/24 0832)  Resp: 19 (01/23/24 0832)  BP: 104/63 (01/23/24 0832)  SpO2: 95 % (01/23/24 0830) Vital Signs (24h Range):  Temp:  [98.1 °F (36.7 °C)] 98.1 °F (36.7 °C)  Pulse:  [72-91] 91  Resp:  [18-22] 19  SpO2:  [89 %-96 %] 95 %  BP: (104-142)/(55-67) 104/63     Weight: 63.5 kg (140 lb)  Body mass index is 24.03 kg/m².    SpO2: 95 %       No intake or output data in the 24 hours ending 01/23/24 0955    Lines/Drains/Airways       Peripheral Intravenous Line  Duration                  Peripheral IV - Single Lumen 01/23/24 0415 20 G;1 in Anterior;Left;Proximal Forearm <1 day                     Physical Exam  Constitutional:       Appearance: He is well-developed.   HENT:      Head: Normocephalic and atraumatic.   Eyes:      Conjunctiva/sclera: Conjunctivae normal.      Pupils: Pupils are equal, round, and reactive to light.   Cardiovascular:      Rate and Rhythm: Normal rate.      Pulses: Intact distal pulses.      Heart sounds: Normal heart sounds.   Pulmonary:      Effort: Pulmonary effort is normal.      Breath sounds: Normal breath sounds.   Abdominal:      General: Bowel sounds are normal.      Palpations: Abdomen is soft.   Musculoskeletal:         General: Normal range of motion.      Cervical back: Normal range of motion and neck supple.   Skin:     General: Skin is warm and dry.   Neurological:      Mental Status: He is alert and oriented to person, place, and time.          Significant Labs: All pertinent lab results from the last 24 hours have been reviewed.    Significant Imaging: Echocardiogram: 2D echo with color flow doppler: No results found for this or any previous visit.

## 2024-01-23 NOTE — ED NOTES
Report received from MOR Castelan. Pt ambulated to restroom independently. Once back in room, cardiac monitoring, pulse ox and automated BP cuff continued. Pt denies any complaint or concern at this time. Pt verbalizes understanding of plan of care. Call light within reach, bed locked in lowest position.

## 2024-01-23 NOTE — PHARMACY MED REC
"  Admission Medication History     The home medication history was taken by Jazmine Kaye CPhT.      You may go to "Admission" then "Reconcile Home Medications" tabs to review and/or act upon these items.     The home medication list has been updated by the Pharmacy department.   Please read ALL comments highlighted in yellow.   Please address this information as you see fit.    Feel free to contact us if you have any questions or require assistance.      The medications listed below were removed from the home medication list. Please reorder if appropriate:  Patient reports no longer taking the following medication(s):  Crestor 20 mg tab  Zocor 10 mg tab  Tessalon 100 mg tab  Kenalog 0.1% paste      Medications listed below were obtained from: Patient/family and Analytic software- [employer]  (Not in a hospital admission)      Patient stated that his doctor took him off atorvastatin 20 mg tab but looking at his lab results he put himself back on this medication due to his labs showing his cholesterol was high.    Patient is on Vascepa 1 gm take 2 caps po bid    Jazmine Kaye CP.  [phone]                .          "

## 2024-01-23 NOTE — ASSESSMENT & PLAN NOTE
Presents to ED for non radiating mid sternal chest tightness associated with shortness of breath x 7 days . Symptoms lasted longer and more intense so he drive himself to ED.   Quit smoking 20 yrs ago  D dimer negative  CXR no PNA   EKG NSR 90 , Troponin x 2 neg, BNP neg  2 D echo  ASA, statin  Add TSH  Cardiology consult -NST

## 2024-01-23 NOTE — HPI
Eduardo Jones is a 68-year-old male with significant history for gout, hyperlipidemia, remote smoker quit 20 years ago -40 year pack smoker who drove himself to the ED for non radiating mid sternal chest tightness associated with shortness of breath and palpitation heart rate 120 last night.  For the past 7 days patient has been experience midsternal chest tightness the last about a minute and resolved with rest.  Symptoms lasted longer and more intense last night; thus, drove himself to the ED. he fell in on his right hip while working in the garden yesterday.  He denies hitting his chest or head.  No previous history of MI or stroke.  No family history of MI or stroke.  He has not clear if activity worsened symptoms but did state symptoms resolved with rest. Denies wheezing, recent URI, headaches, dizziness, change in vision,  diaphoresis, orthopnea, PND, abdominal pain, nausea, vomiting, or extremities weakness/numbness. Denies recent sick contacts, travel, or injury. No new physical limitation in the past month.     In ED EKG normal sinus rhythm . Troponin negative x 2. BNP normal  Noted to be 85-88 on room air with activity.  States Right hip and lower back pain.   D dimer negative.  X ray right hip no fractures.  Received maloxx, dexamethasone, albuterol/atrovent neb , protonix in ED.

## 2024-01-23 NOTE — H&P
[facility]  [facility] Medicine  History & Physical    Patient Name: Eduardo Jones  MRN: [id number]  Patient Class: OP- Observation  Admission Date: 1/23/2024  Attending Physician: Rik Nascimento MD   Primary Care Provider: Ada Lyons MD         Patient information was obtained from patient and ER records.     Subjective:     Principal Problem:Chest pain    Chief Complaint:   Chief Complaint   Patient presents with    Chest Pain     Pt chief complaint is Chest pain. Pt states has had chest pain for about a week but has got worse over last 24 hours. Pt states pain is intermittent but today has been constant.         HPI: Eduardo Jones is a 68-year-old male with significant history for gout, hyperlipidemia, remote smoker quit 20 years ago -40 year pack smoker who drove himself to the ED for non radiating mid sternal chest tightness associated with shortness of breath and palpitation heart rate 120 last night.  For the past 7 days patient has been experience midsternal chest tightness the last about a minute and resolved with rest.  Symptoms lasted longer and more intense last night; thus, drove himself to the ED. he fell in on his right hip while working in the garden yesterday.  He denies hitting his chest or head.  No previous history of MI or stroke.  No family history of MI or stroke.  He has not clear if activity worsened symptoms but did state symptoms resolved with rest. Denies wheezing, recent URI, headaches, dizziness, change in vision,  diaphoresis, orthopnea, PND, abdominal pain, nausea, vomiting, or extremities weakness/numbness. Denies recent sick contacts, travel, or injury. No new physical limitation in the past month.     In ED EKG normal sinus rhythm . Troponin negative x 2. BNP normal  Noted to be 85-88 on room air with activity.  States Right hip and lower back pain.   D dimer negative.  X ray right hip no fractures.  Received maloxx, dexamethasone, albuterol/atrovent neb ,  protonix in ED.       Past Medical History:   Diagnosis Date    Hyperlipidemia        Past Surgical History:   Procedure Laterality Date    ANKLE SURGERY Right     fracture       Review of patient's allergies indicates:  No Known Allergies    No current facility-administered medications on file prior to encounter.     Current Outpatient Medications on File Prior to Encounter   Medication Sig    allopurinoL (ZYLOPRIM) 300 MG tablet Take 300 mg by mouth once daily.    atorvastatin (LIPITOR) 20 MG tablet Take 20 mg by mouth once daily.    chlorhexidine (PERIDEX) 0.12 % solution 15 mLs 2 (two) times daily.    clotrimazole-betamethasone 1-0.05% (LOTRISONE) cream Apply topically 2 (two) times daily as needed.    fluticasone propionate (FLONASE) 50 mcg/actuation nasal spray 2 sprays by Each Nostril route daily as needed.    naproxen (NAPROSYN) 500 MG tablet Take 500 mg by mouth 2 (two) times daily as needed.    VASCEPA 1 gram Cap Take 2 capsules by mouth 2 (two) times daily.    [DISCONTINUED] atorvastatin (LIPITOR) 20 MG tablet Take 20 mg by mouth once daily.    [DISCONTINUED] benzonatate (TESSALON) 100 MG capsule Take 100-200 mg by mouth 3 (three) times daily as needed.    [DISCONTINUED] rosuvastatin (CRESTOR) 20 MG tablet Take 20 mg by mouth nightly.    [DISCONTINUED] simvastatin (ZOCOR) 10 MG tablet SMARTSI Tablet(s) By Mouth Every Evening    [DISCONTINUED] triamcinolone acetonide 0.1% (KENALOG) 0.1 % paste 2 (two) times daily.     Family History    None       Tobacco Use    Smoking status: Never    Smokeless tobacco: Never   Substance and Sexual Activity    Alcohol use: Yes     Comment: sometimes    Drug use: Never    Sexual activity: Not on file     Review of Systems   Constitutional:  Negative for activity change, appetite change, chills, diaphoresis and fatigue.   HENT: Negative.     Respiratory:  Positive for chest tightness and shortness of breath. Negative for cough, choking, wheezing and stridor.   "  Cardiovascular:  Positive for chest pain.   Gastrointestinal: Negative.    Genitourinary: Negative.    Musculoskeletal: Negative.    Skin: Negative.    Psychiatric/Behavioral: Negative.     Objective:     Vital Signs (Most Recent):  Temp: 98.1 °F (36.7 °C) (01/23/24 0343)  Pulse: 91 (01/23/24 0832)  Resp: 19 (01/23/24 0832)  BP: 104/63 (01/23/24 0832)  SpO2: 95 % (01/23/24 0830) Vital Signs (24h Range):  Temp:  [98.1 °F (36.7 °C)] 98.1 °F (36.7 °C)  Pulse:  [72-91] 91  Resp:  [18-22] 19  SpO2:  [89 %-96 %] 95 %  BP: (104-142)/(55-67) 104/63     Weight: 63.5 kg (140 lb)  Body mass index is 24.03 kg/m².     Physical Exam  Constitutional:       Appearance: Normal appearance. He is not ill-appearing.   HENT:      Head: Atraumatic.      Nose: Nose normal.   Cardiovascular:      Rate and Rhythm: Normal rate and regular rhythm.   Pulmonary:      Comments: diminished  Abdominal:      Palpations: Abdomen is soft.   Musculoskeletal:      Cervical back: Normal range of motion.      Right lower leg: No edema.      Left lower leg: No edema.   Skin:     General: Skin is warm and dry.   Neurological:      General: No focal deficit present.      Mental Status: He is alert and oriented to person, place, and time.            Significant Labs: All pertinent labs within the past 24 hours have been reviewed.    Significant Imaging: I have reviewed all pertinent imaging results/findings within the past 24 hours.  Assessment/Plan:     * Chest pain  Presents to ED for non radiating mid sternal chest tightness associated with shortness of breath x 7 days . Symptoms lasted longer and more intense so he drive himself to ED.   Quit smoking 20 yrs ago  D dimer negative  CXR no PNA   EKG NSR 90 , Troponin x 2 neg, BNP neg  2 D echo  ASA, statin  Add TSH  Cardiology consult -NST                   Hyperlipidemia  No results found for: "LDH"   Add lipid panel     Acute respiratory failure with hypoxia  Patient with Hypoxic Respiratory failure " which is Acute.  he is not on home oxygen. Supplemental oxygen was provided and noted-    Signs/symptoms of respiratory failure include- new [id number] Contributing diagnoses includes -  possible under dx COPD  Labs and images were reviewed. Patient Has not had a recent ABG. Will treat underlying causes and adjust management of respiratory failure as follows-   ACS or under dx COPD given history however hypoxia   Currently comfortable on 2 L NC 02 sat 90% on 2 L NC at rest , Lung diminished  No wheezing on exam  Hold further steroid  Continue duoneb         VTE Risk Mitigation (From admission, onward)      None                 On 01/23/2024, patient should be placed in hospital observation services under my care in collaboration with Rik Nascimento MD.      AdmissionCare    Guideline: Chest Pain - OBS, Observation    Based on the indications selected for the patient, the bed status of Admit to Observation was determined to be MET    The following indications were selected as present at the time of evaluation of the patient:      Chest pain (or other anginal equivalent) not classified as low risk for acute coronary syndrome, as indicated by 1 or more of the following:   -     - Patient classified as intermediate risk or high risk for acute coronary syndrome (eg, via use of a clinical decision tool or risk calculator (eg, HEART score greater than 3))    AdmissionCare documentation entered by: Terell Hammond    [facility] [employer], 27th edition, Copyright © 2023 [employer] All Rights Reserved.  [id number]:35:22-06:00    Ayana Mtz NP  Department of Hospital Medicine  [facility] - Emergency Dept

## 2024-01-23 NOTE — ADMISSIONCARE
AdmissionCare    Guideline: Chest Pain - OBS, Observation    Based on the indications selected for the patient, the bed status of Admit to Observation was determined to be MET    The following indications were selected as present at the time of evaluation of the patient:      Chest pain (or other anginal equivalent) not classified as low risk for acute coronary syndrome, as indicated by 1 or more of the following:   -     - Patient classified as intermediate risk or high risk for acute coronary syndrome (eg, via use of a clinical decision tool or risk calculator (eg, HEART score greater than 3))    AdmissionCare documentation entered by: Terell Hammond    [facility], 27th edition, Copyright © 2023 [facility] [employer], [facility] All Rights Reserved.  [id number]:35:22-06:00

## 2024-01-23 NOTE — PLAN OF CARE
01/23/24 1028   Discharge Planning   Assessment Type Discharge Planning Brief Assessment   Resource/Environmental Concerns none   Support Systems Children   Equipment Currently Used at Home none   Current Living Arrangements home   Patient/Family Anticipates Transition to home   Patient/Family Anticipated Services at Transition none   DME Needed Upon Discharge  other (see comments)  (TBD)   Discharge Plan A Home with family       [id number] - [place], [place] - [address] AT [address] [place] [address]  Phone: [phone] Fax: [phone]

## 2024-01-23 NOTE — ASSESSMENT & PLAN NOTE
Patient with Hypoxic Respiratory failure which is Acute.  he is not on home oxygen. Supplemental oxygen was provided and noted-    Signs/symptoms of respiratory failure include- new [id number] Contributing diagnoses includes -  possible under dx COPD  Labs and images were reviewed. Patient Has not had a recent ABG. Will treat underlying causes and adjust management of respiratory failure as follows-   ACS or under dx COPD given history however hypoxia   Currently comfortable on 2 L NC 02 sat 90% on 2 L NC at rest , Lung diminished  No wheezing on exam  Hold further steroid  Continue duoneb

## 2024-01-23 NOTE — CONSULTS
[facility] - Emergency Dept  Cardiology  Consult Note    Patient Name: Eduardo Jones  MRN: [id number]  Admission Date: 1/23/2024  Hospital Length of Stay: 0 days  Code Status: Full Code   Attending Provider: Rik Nascimento MD   Consulting Provider: Navneet Matos MD  Primary Care Physician: Ada Lyons MD  Principal Problem:Chest pain    Patient information was obtained from patient and ER records.     Inpatient consult to Cardiology  Consult performed by: Navneet Matos MD  Consult ordered by: Ayana Mtz NP        Subjective:     Chief Complaint:  CP         HPI: Eduardo Jones is a 68-year-old male with significant history for gout, hyperlipidemia, remote smoker quit 20 years ago -40 year pack smoker who drove himself to the ED for non radiating mid sternal chest tightness associated with shortness of breath and palpitation heart rate 120 last night.  For the past 7 days patient has been experience midsternal chest tightness the last about a minute and resolved with rest.  Symptoms lasted longer and more intense last night; thus, drove himself to the ED. he fell in on his right hip while working in the garden yesterday.  He denies hitting his chest or head.  No previous history of MI or stroke.  No family history of MI or stroke.  He has not clear if activity worsened symptoms but did state symptoms resolved with rest. Denies wheezing, recent URI, headaches, dizziness, change in vision,  diaphoresis, orthopnea, PND, abdominal pain, nausea, vomiting, or extremities weakness/numbness. Denies recent sick contacts, travel, or injury. No new physical limitation in the past month.      In ED EKG normal sinus rhythm . Troponin negative x 2. BNP normal  Noted to be 85-88 on room air with activity.  States Right hip and lower back pain.   D dimer negative.  X ray right hip no fractures.  Received maloxx, dexamethasone, albuterol/atrovent neb , protonix in ED.     Currently denies CP or SOB  Denies prior  CAD    Past Medical History:   Diagnosis Date    Hyperlipidemia        Past Surgical History:   Procedure Laterality Date    ANKLE SURGERY Right     fracture       Review of patient's allergies indicates:  No Known Allergies    No current facility-administered medications on file prior to encounter.     Current Outpatient Medications on File Prior to Encounter   Medication Sig    allopurinoL (ZYLOPRIM) 300 MG tablet Take 300 mg by mouth once daily.    atorvastatin (LIPITOR) 20 MG tablet Take 20 mg by mouth once daily.    chlorhexidine (PERIDEX) 0.12 % solution 15 mLs 2 (two) times daily.    clotrimazole-betamethasone 1-0.05% (LOTRISONE) cream Apply topically 2 (two) times daily as needed.    fluticasone propionate (FLONASE) 50 mcg/actuation nasal spray 2 sprays by Each Nostril route daily as needed.    naproxen (NAPROSYN) 500 MG tablet Take 500 mg by mouth 2 (two) times daily as needed.    VASCEPA 1 gram Cap Take 2 capsules by mouth 2 (two) times daily.    [DISCONTINUED] atorvastatin (LIPITOR) 20 MG tablet Take 20 mg by mouth once daily.    [DISCONTINUED] benzonatate (TESSALON) 100 MG capsule Take 100-200 mg by mouth 3 (three) times daily as needed.    [DISCONTINUED] rosuvastatin (CRESTOR) 20 MG tablet Take 20 mg by mouth nightly.    [DISCONTINUED] simvastatin (ZOCOR) 10 MG tablet SMARTSI Tablet(s) By Mouth Every Evening    [DISCONTINUED] triamcinolone acetonide 0.1% (KENALOG) 0.1 % paste 2 (two) times daily.     Family History    None       Tobacco Use    Smoking status: Never    Smokeless tobacco: Never   Substance and Sexual Activity    Alcohol use: Yes     Comment: sometimes    Drug use: Never    Sexual activity: Not on file     Review of Systems   Constitutional: Negative for decreased appetite.   HENT:  Negative for ear discharge.    Eyes:  Negative for blurred vision.   Endocrine: Negative for polyphagia.   Skin:  Negative for nail changes.   Genitourinary:  Negative for bladder incontinence.    Neurological:  Negative for aphonia.   Psychiatric/Behavioral:  Negative for hallucinations.    Allergic/Immunologic: Negative for hives.     Objective:     Vital Signs (Most Recent):  Temp: 98.1 °F (36.7 °C) (01/23/24 0343)  Pulse: 91 (01/23/24 0832)  Resp: 19 (01/23/24 0832)  BP: 104/63 (01/23/24 0832)  SpO2: 95 % (01/23/24 0830) Vital Signs (24h Range):  Temp:  [98.1 °F (36.7 °C)] 98.1 °F (36.7 °C)  Pulse:  [72-91] 91  Resp:  [18-22] 19  SpO2:  [89 %-96 %] 95 %  BP: (104-142)/(55-67) 104/63     Weight: 63.5 kg (140 lb)  Body mass index is 24.03 kg/m².    SpO2: 95 %       No intake or output data in the 24 hours ending 01/23/24 0955    Lines/Drains/Airways       Peripheral Intravenous Line  Duration                  Peripheral IV - Single Lumen 01/23/24 0415 20 G;1 in Anterior;Left;Proximal Forearm <1 day                     Physical Exam  Constitutional:       Appearance: He is well-developed.   HENT:      Head: Normocephalic and atraumatic.   Eyes:      Conjunctiva/sclera: Conjunctivae normal.      Pupils: Pupils are equal, round, and reactive to light.   Cardiovascular:      Rate and Rhythm: Normal rate.      Pulses: Intact distal pulses.      Heart sounds: Normal heart sounds.   Pulmonary:      Effort: Pulmonary effort is normal.      Breath sounds: Normal breath sounds.   Abdominal:      General: Bowel sounds are normal.      Palpations: Abdomen is soft.   Musculoskeletal:         General: Normal range of motion.      Cervical back: Normal range of motion and neck supple.   Skin:     General: Skin is warm and dry.   Neurological:      Mental Status: He is alert and oriented to person, place, and time.          Significant Labs: All pertinent lab results from the last 24 hours have been reviewed.    Significant Imaging: Echocardiogram: 2D echo with color flow doppler: No results found for this or any previous visit.  Assessment and Plan:     * Chest pain  No MI. Several risk factors. Echo and stress test  today - ok for d/c if negative for ischemia    Hyperlipidemia  On statin        VTE Risk Mitigation (From admission, onward)      None            Thank you for your consult. I will follow-up with patient. Please contact us if you have any additional questions.    Navneet Matos MD  Cardiology   [facility] - [place] - Emergency Dept

## 2024-01-23 NOTE — ED NOTES
Resting quietly, denies any complaints No chest pain or sob.  Resp even and unlabored, skin warm and dry.  Wife at bedside. Awaiting admit bed

## 2024-01-23 NOTE — ED PROVIDER NOTES
Encounter Date: 1/23/2024       History     Chief Complaint   Patient presents with    Chest Pain     Pt chief complaint is Chest pain. Pt states has had chest pain for about a week but has got worse over last 24 hours. Pt states pain is intermittent but today has been constant.      67yo M presents to ED with chief complaint chest pain.    Patient admits to intermittent chest pain over the past 7 days.  Pain is described as a mild pressure to the lower substernal region.  Pain is nonradiating.  Pain typically lasts a minute or two, spontaneously resolves.  There is associated shortness of breath, possibly palpitations.  Denies associated nausea vomiting, diaphoresis, syncope or near-syncope.  No personal history of ACS.  No history of hypertension, no DM, no known family history of premature cardiac disease.  Patient states around midnight he was woken from sleep by lower substernal chest pressure.  Pain has been a bit more severe, persistent than usual.  Again, there is associated shortness of breath, palpitations.  No meds taken prior to arrival.  No associated abdominal pain.  Denies history of reflux.    Denies history of CHF.  Denies any new lower extremity swelling or calf pain.  No orthopnea.  No paroxysmal nocturnal dyspnea. Denies any recent illness.  No fever chills myalgias.  No cough.    Also with R hip/low back pain after mechanical fall around 4pm today.  States was working in the garden, tripped over gravel or something in the garden, fell onto his right side.  Admits to mild discomfort to the right-sided posterior hip/low back since the injury.  Associated mildly antalgic gait.  No leg weakness.  No saddle anesthesia.  No bowel or bladder incontinence or retention.  Denies head trauma or LOC. No neck pain or stiffness. No open wounds.      PMH:  HLD      TTE 04/06/2020:   · Normal left ventricle cavity size. Normal wall thickness. Normal wall    motion. Normal (55-65%) ejection fraction.  Indeterminate diastolic    function.   · Right ventricle not well visualized. Normal right ventricle cavity size    and ejection fraction.    Cardiac perfusion SPECT 4/6/20:  1. The ejection fraction is estimated at 67%.   2. Left ventricular wall motion is grossly normal.   3. No significant region of reversible stress-induced ischemia identified..        Review of patient's allergies indicates:  No Known Allergies  Past Medical History:   Diagnosis Date    Hyperlipidemia      Past Surgical History:   Procedure Laterality Date    ANKLE SURGERY Right     fracture     No family history on file.  Social History     Tobacco Use    Smoking status: Never    Smokeless tobacco: Never   Substance Use Topics    Alcohol use: Yes     Comment: sometimes    Drug use: Never     Review of Systems   Constitutional:  Negative for appetite change, chills, diaphoresis and fever.   Respiratory:  Positive for shortness of breath. Negative for cough and wheezing.    Cardiovascular:  Positive for chest pain and palpitations. Negative for leg swelling.   Gastrointestinal:  Negative for abdominal pain, nausea and vomiting.   Musculoskeletal:  Positive for arthralgias and gait problem. Negative for back pain, neck pain and neck stiffness.   Skin:  Negative for rash and wound.   Neurological:  Negative for syncope and weakness.       Physical Exam     Initial Vitals [01/23/24 0343]   BP Pulse Resp Temp SpO2   (!) 142/66 86 18 98.1 °F (36.7 °C) 95 %      MAP       --         Physical Exam    Nursing note and vitals reviewed.  Constitutional: He appears well-developed and well-nourished. He is not diaphoretic. No distress.   HENT:   Head: Normocephalic and atraumatic.   Neck: Neck supple.   Normal range of motion.  Cardiovascular:  Normal rate, regular rhythm, normal heart sounds and intact distal pulses.           2+ PT bilaterally.  No unilateral leg swelling or calf tenderness.  No pretibial edema.   Pulmonary/Chest: No respiratory distress.    Mild inspiratory crackles to bilateral lower lung zones.  No hypoxia on room air, no tachypnea, no accessory muscle use. Mild ttp lower midline sternum.    Abdominal: Abdomen is soft. There is no abdominal tenderness.   Musculoskeletal:         General: Normal range of motion.      Cervical back: Normal range of motion and neck supple.      Comments: No midline spine ttp. Mild ttp R posterior iliac spine; no bony deformity.      Neurological: He is alert and oriented to person, place, and time. GCS score is 15. GCS eye subscore is 4. GCS verbal subscore is 5. GCS motor subscore is 6.   Skin: Skin is warm.   Psychiatric: He has a normal mood and affect. Thought content normal.         ED Course   Procedures  Labs Reviewed   CBC W/ AUTO DIFFERENTIAL - Abnormal; Notable for the following components:       Result Value    MCH 31.4 (*)     Platelets 135 (*)     All other components within normal limits   COMPREHENSIVE METABOLIC PANEL - Abnormal; Notable for the following components:    Potassium 3.3 (*)     Calcium 8.4 (*)     Alkaline Phosphatase 48 (*)     All other components within normal limits   ISTAT PROCEDURE - Abnormal; Notable for the following components:    POC PCO2 34.6 (*)     POC PO2 31 (*)     POC HCO3 22.0 (*)     POC TCO2 23 (*)     All other components within normal limits   TROPONIN I   B-TYPE NATRIURETIC PEPTIDE   D DIMER, QUANTITATIVE   MAGNESIUM   TROPONIN I   SARS-COV-2 RDRP GENE   POCT INFLUENZA A/B MOLECULAR     EKG Readings: (Independently Interpreted)   Sinus tachycardia, ventricular rate 90 beats per minute.  Normal HI, normal QT, slightly prolonged QRS duration.  There is right axis deviation.  No convincing ST elevation.  No convincing ST elevation.       Imaging Results              X-Ray Chest AP Portable (In process)                      X-Ray Hip 2 or 3 views Right (with Pelvis when performed) (In process)                   X-Rays:   Independently Interpreted Readings:   Chest X-Ray:  "Personal interpretation:  Borderline cardiomegaly, no obvious effusion, no dense lobar consolidation, no obvious pneumothorax.  Questionable atherosclerosis to the aorta.   Other Readings:  Hip x-ray:  Personal interpretation:  Pelvis appears stable without any obvious bony abnormality to the area of tenderness    Medications   aspirin tablet 325 mg (325 mg Oral Given 1/23/24 0510)   methocarbamoL tablet 750 mg (750 mg Oral Given 1/23/24 0558)   potassium bicarbonate disintegrating tablet 50 mEq (50 mEq Oral Given 1/23/24 0626)   pantoprazole injection 40 mg (40 mg Intravenous Given 1/23/24 0626)   aluminum-magnesium hydroxide-simethicone 200-200-20 mg/5 mL suspension 30 mL (30 mLs Oral Given 1/23/24 0626)   ipratropium 0.02 % nebulizer solution 1 mg (1 mg Nebulization Given 1/23/24 0737)   albuterol sulfate nebulizer solution 10 mg (10 mg Nebulization Given 1/23/24 0737)   dexAMETHasone injection 8 mg (8 mg Intravenous Given 1/23/24 0739)     Medical Decision Making  Differential diagnosis: ACS, PE, GERD, pneumothorax, aortic dissection    Amount and/or Complexity of Data Reviewed  Labs: ordered. Decision-making details documented in ED Course.  Radiology: ordered and independent interpretation performed. Decision-making details documented in ED Course.     Details: [facility] interpretation of x-rays:  "No definite acute finding in the chest, slight right paratracheal mediastinal prominence although probably due to rotation." Hip x-ray without any acute findings.  ECG/medicine tests: ordered and independent interpretation performed. Decision-making details documented in ED Course.  Discussion of management or test interpretation with external provider(s): No personal history of ACS.  Grossly unremarkable, reassuring EKG without convincing evidence of ischemia.  Chest pain has been constant since midnight, reproducible with palpation.  There is associated shortness of breath, palpitations.  No syncope or near-syncope, no " nausea vomiting, no diaphoresis.  Pain intermittent over the past week.  No widened mediastinum on chest x-ray.  No significant hypertension.  No associated back pain.  Think unlikely aortic dissection.  Patient signed out to  On further assessment and disposition      Patient signed out to me pending reassessment and disposition.  Patient's O2 sats still sitting in the high 80s to low 90s.  Breathing treatments ordered for patient.  Patient's D-dimer is negative.  Placed on 2 L oxygen.  Patient is placed observation further workup management.  Patient has not had any recent cardiac workup that I can see    Please put in 35 minutes of critical care due to patient having a high risk of respiratory failure.   Separate from teaching and exclusive of procedure and ekg time  Includes:  Time at bedside  Time reviewing test results  Time discussing case with staff  Time documenting the medical record  Time spent with family members  Time spent with consults  Management    No history of thrombophilia.  No recent surgery.  No major trauma. No recent immobility.  No active cancer.  No exogenous estrogen. No history of percutaneous indwelling catheter. No previous DVT/PE.  No significant tachycardia.  No hypoxia.  No pleuritic complaints. Think unlikely PE as culprit of patient's chest discomfort or shortness of breath.    Patient became hypoxic to 88, 89% while in the ED. ambulatory pulse ox pending.  Patient does state he has a former smoker, however no hypoxia upon arrival to the ED. Given worsening shortness of breath, now with hypoxia, chest pain over the past 7 days, plan to admit for evaluation of hypoxia, current complaints.  COVID, flu pending.  VBG pending.  Serial troponin pending.    Risk  OTC drugs.  Prescription drug management.                                      Clinical Impression:  Final diagnoses:  [R07.9] Chest pain (Primary)  [M25.551] Right hip pain  [E87.6] Hypokalemia  [W19.XXXA] Fall, initial  encounter  [R09.02] Hypoxia          ED Disposition Condition    Observation Stable                Gilbert Fernandes MD  01/23/24 0757

## 2024-01-24 VITALS
WEIGHT: 135.38 LBS | OXYGEN SATURATION: 93 % | SYSTOLIC BLOOD PRESSURE: 121 MMHG | HEART RATE: 78 BPM | RESPIRATION RATE: 16 BRPM | TEMPERATURE: 98 F | HEIGHT: 64 IN | DIASTOLIC BLOOD PRESSURE: 61 MMHG | BODY MASS INDEX: 23.11 KG/M2

## 2024-01-24 LAB
ALBUMIN SERPL BCP-MCNC: 3.4 G/DL (ref 3.5–5.2)
ALP SERPL-CCNC: 48 U/L (ref 55–135)
ALT SERPL W/O P-5'-P-CCNC: 18 U/L (ref 10–44)
ANION GAP SERPL CALC-SCNC: 7 MMOL/L (ref 8–16)
AST SERPL-CCNC: 19 U/L (ref 10–40)
BASOPHILS # BLD AUTO: 0.02 K/UL (ref 0–0.2)
BASOPHILS NFR BLD: 0.2 % (ref 0–1.9)
BILIRUB SERPL-MCNC: 0.5 MG/DL (ref 0.1–1)
BUN SERPL-MCNC: 15 MG/DL (ref 8–23)
CALCIUM SERPL-MCNC: 8.7 MG/DL (ref 8.7–10.5)
CHLORIDE SERPL-SCNC: 106 MMOL/L (ref 95–110)
CO2 SERPL-SCNC: 23 MMOL/L (ref 23–29)
CREAT SERPL-MCNC: 0.8 MG/DL (ref 0.5–1.4)
DIFFERENTIAL METHOD BLD: ABNORMAL
EOSINOPHIL # BLD AUTO: 0 K/UL (ref 0–0.5)
EOSINOPHIL NFR BLD: 0 % (ref 0–8)
ERYTHROCYTE [DISTWIDTH] IN BLOOD BY AUTOMATED COUNT: 13.2 % (ref 11.5–14.5)
EST. GFR  (NO RACE VARIABLE): >60 ML/MIN/1.73 M^2
GLUCOSE SERPL-MCNC: 151 MG/DL (ref 70–110)
HCT VFR BLD AUTO: 43.2 % (ref 40–54)
HGB BLD-MCNC: 14.6 G/DL (ref 14–18)
IMM GRANULOCYTES # BLD AUTO: 0.04 K/UL (ref 0–0.04)
IMM GRANULOCYTES NFR BLD AUTO: 0.4 % (ref 0–0.5)
LYMPHOCYTES # BLD AUTO: 2 K/UL (ref 1–4.8)
LYMPHOCYTES NFR BLD: 21.3 % (ref 18–48)
MCH RBC QN AUTO: 31.9 PG (ref 27–31)
MCHC RBC AUTO-ENTMCNC: 33.8 G/DL (ref 32–36)
MCV RBC AUTO: 94 FL (ref 82–98)
MONOCYTES # BLD AUTO: 0.5 K/UL (ref 0.3–1)
MONOCYTES NFR BLD: 5.5 % (ref 4–15)
NEUTROPHILS # BLD AUTO: 7 K/UL (ref 1.8–7.7)
NEUTROPHILS NFR BLD: 72.6 % (ref 38–73)
NRBC BLD-RTO: 0 /100 WBC
PLATELET # BLD AUTO: 141 K/UL (ref 150–450)
PMV BLD AUTO: 9.4 FL (ref 9.2–12.9)
POTASSIUM SERPL-SCNC: 3.9 MMOL/L (ref 3.5–5.1)
PROT SERPL-MCNC: 6.7 G/DL (ref 6–8.4)
RBC # BLD AUTO: 4.58 M/UL (ref 4.6–6.2)
SODIUM SERPL-SCNC: 136 MMOL/L (ref 136–145)
WBC # BLD AUTO: 9.58 K/UL (ref 3.9–12.7)

## 2024-01-24 PROCEDURE — 80053 COMPREHEN METABOLIC PANEL: CPT | Performed by: NURSE PRACTITIONER

## 2024-01-24 PROCEDURE — G0378 HOSPITAL OBSERVATION PER HR: HCPCS

## 2024-01-24 PROCEDURE — 96376 TX/PRO/DX INJ SAME DRUG ADON: CPT

## 2024-01-24 PROCEDURE — C9113 INJ PANTOPRAZOLE SODIUM, VIA: HCPCS | Performed by: NURSE PRACTITIONER

## 2024-01-24 PROCEDURE — 85025 COMPLETE CBC W/AUTO DIFF WBC: CPT | Performed by: NURSE PRACTITIONER

## 2024-01-24 PROCEDURE — 25000003 PHARM REV CODE 250: Performed by: NURSE PRACTITIONER

## 2024-01-24 PROCEDURE — 36415 COLL VENOUS BLD VENIPUNCTURE: CPT | Performed by: NURSE PRACTITIONER

## 2024-01-24 PROCEDURE — 63600175 PHARM REV CODE 636 W HCPCS: Performed by: NURSE PRACTITIONER

## 2024-01-24 RX ORDER — ALBUTEROL SULFATE 90 UG/1
2 AEROSOL, METERED RESPIRATORY (INHALATION) EVERY 6 HOURS PRN
Qty: 18 G | Refills: 0 | Status: SHIPPED | OUTPATIENT
Start: 2024-01-24

## 2024-01-24 RX ADMIN — PANTOPRAZOLE SODIUM 40 MG: 40 INJECTION, POWDER, FOR SOLUTION INTRAVENOUS at 08:01

## 2024-01-24 RX ADMIN — ALLOPURINOL 300 MG: 100 TABLET ORAL at 08:01

## 2024-01-24 RX ADMIN — ASPIRIN 81 MG: 81 TABLET, COATED ORAL at 08:01

## 2024-01-24 NOTE — NURSING
In preparation for discharge, removal of patient's saline lock and heart monitor per policy.  No distress noted.

## 2024-01-24 NOTE — PLAN OF CARE
01/24/24 1033   Final Note   Assessment Type Final Discharge Note   Anticipated Discharge Disposition Home   Hospital Resources/Appts/Education Provided Appointments scheduled and added to AVS   Post-Acute Status   Post-Acute Authorization Other   Other Status No Post-Acute Service Needs     Case Management Final Discharge Note      Discharge Disposition: home with follow up     Relevant SDOH / Transition of Care Barriers: n/a      Scheduled followup appointment:    Follow-up Information       Navneet Matos MD Follow up on 4/4/2024.    Specialty: Cardiology  Why: at 9:15am-- sooner appointment requested-- you will be contacted if one becomes available  Contact information:  [address]  [address]  [facility] [address]  [phone]               Ada Lyons MD Follow up on 1/31/2024.    Specialty: Family Medicine  Why: at 1:00pm  Contact information:  [address] [facility] [address]  [phone]                               Referrals placed: n/a    Transportation: family to transport to home        Pts nurse Miriam notified that the pt is clear for d/c from CM standpoint

## 2024-01-24 NOTE — ED NOTES
Pt placed on Tele box [id number]. Pt transported to room 417 per stretcher w/ transport.  Pt is AAOx3, resp even and unlabored, skin warm and dry. No complaints. HOB elevated, SR up x 2.

## 2024-01-24 NOTE — NURSING
Pt arrived on unit, awake alert and oriented. IV site noted, saline lock. Pt on room air, SpO2 89%. Placed pt back on 2L O2 NC, SpO2 93%, no distress noted. Vitals assessed. Pt ambulated to bathroom independently. Tele #[id number] placed on pt. Granddaughter at bedside. Safety measures maintained. Will cont to monitor

## 2024-01-25 NOTE — HOSPITAL COURSE
Admission to observation for chest pain.  ACS ruled out.  Stress test negative for ischemia.  2D echo normal EF.  Symptoms improved after steroid and DuoNeb in ED. CT of the chest showed 3 mm nodule along the right greater fissure and small band of atelectasis at the left lung base otherwise no consolidation, pleural effusion or pneumothorax.  Hypoxia improved overnight.  On discharge he denies any chest pain or shortness a breath.  O2 saturation 97% on room air at rest and 92% on room air with activity.  Concerned there is a COPD component it to his is presentation.  Continue albuterol p.r.n. for shortness of breath.  Ambulatory referral to pulmonologist and [facility].  All findings and plan were explained to the patient. All questions and concerns were answered. Patient verbalized understanding. Patient is in stable condition to d/c home and has been informed to follow up with his PCP, Cardiology, and Pulmonology. Patient has been educated to return to the ED if He experiences any further chest pain, sob, lightheadness, weakness, or discomfort.

## 2024-01-25 NOTE — DISCHARGE SUMMARY
[facility] Medicine  Discharge Summary      Patient Name: Eduardo Jones  MRN: [id number]  BRADEN: [id number]  Patient Class: OP- Observation  Admission Date: 1/23/2024  Hospital Length of Stay: 0 days  Discharge Date and Time: 1/24/2024  1:31 PM  Attending Physician: Rik Nascimento MD  Discharging Provider: Ayana Martino NP  Primary Care Provider: Ada Lyons MD    Primary Care Team: AYANA MARTINO    HPI:   Eduardo Jones is a 68-year-old male with significant history for gout, hyperlipidemia, remote smoker quit 20 years ago -40 year pack smoker who drove himself to the ED for non radiating mid sternal chest tightness associated with shortness of breath and palpitation heart rate 120 last night.  For the past 7 days patient has been experience midsternal chest tightness the last about a minute and resolved with rest.  Symptoms lasted longer and more intense last night; thus, drove himself to the ED. he fell in on his right hip while working in the garden yesterday.  He denies hitting his chest or head.  No previous history of MI or stroke.  No family history of MI or stroke.  He has not clear if activity worsened symptoms but did state symptoms resolved with rest. Denies wheezing, recent URI, headaches, dizziness, change in vision,  diaphoresis, orthopnea, PND, abdominal pain, nausea, vomiting, or extremities weakness/numbness. Denies recent sick contacts, travel, or injury. No new physical limitation in the past month.     In ED EKG normal sinus rhythm . Troponin negative x 2. BNP normal  Noted to be 85-88 on room air with activity.  States Right hip and lower back pain.   D dimer negative.  X ray right hip no fractures.  Received maloxx, dexamethasone, albuterol/atrovent neb , protonix in ED.       * No surgery found *      Hospital Course:   Admission to observation for chest pain.  D Dimer negative. ACS ruled out.  Stress test negative for ischemia.  2D echo normal EF.  Symptoms improved  after steroid and DuoNeb in ED. CT of the chest showed 3 mm nodule along the right greater fissure and small band of atelectasis at the left lung base otherwise no consolidation, pleural effusion or pneumothorax.  Hypoxia improved overnight.  On discharge he denies any chest pain or shortness a breath.  O2 saturation 97% on room air at rest and 92% on room air with activity.  Possible COPD component given history of smoking. Continue albuterol p.r.n. for shortness of breath.  Ambulatory referral to pulmonologist and [facility].  All findings and plan were explained to the patient. All questions and concerns were answered. Patient verbalized understanding. Patient is in stable condition to d/c home and has been informed to follow up with his PCP, Cardiology, and Pulmonology. Patient has been educated to return to the ED if He experiences any further chest pain, sob, lightheadness, weakness, or discomfort.      Goals of Care Treatment Preferences:  Code Status: Full Code      Consults:   Consults (From admission, onward)          Status Ordering Provider     Inpatient consult to Cardiology  Once        Provider:  Navneet Matos MD    Completed JAMSHID MARTINO            No new Assessment & Plan notes have been filed under this hospital service since the last note was generated.  Service: Hospital Medicine    Final Active Diagnoses:    Diagnosis Date Noted POA    PRINCIPAL PROBLEM:  Chest pain [R07.9] 01/23/2024 Yes    Acute respiratory failure with hypoxia [J96.01] 01/23/2024 Yes    Hyperlipidemia [E78.5] 01/23/2024 Yes      Problems Resolved During this Admission:       Discharged Condition: stable    Disposition: Home or Self Care    Follow Up:   Follow-up Information       Navneet Matos MD Follow up on 4/4/2024.    Specialty: Cardiology  Why: at 9:15am-- sooner appointment requested-- you will be contacted if one becomes available  Contact information:  [address]  [address]  [place]  [address]  [phone]               Ada Lyons MD Follow up on 1/31/2024.    Specialty: Family Medicine  Why: at 1:00pm  Contact information:  [place] [address]  [phone]                           Patient Instructions:      Ambulatory referral/consult to [facility]   Standing Status: Future   Referral Priority: Routine Referral Type: Consultation   Referral Reason: Specialty Services Required   Number of Visits Requested: 1     Ambulatory referral/consult to Pulmonology   Standing Status: Future   Referral Priority: Routine Referral Type: Consultation   Referral Reason: Specialty Services Required   Requested Specialty: Pulmonary Disease   Number of Visits Requested: 1     Diet Cardiac     Notify your health care provider if you experience any of the following:  temperature >100.4     Notify your health care provider if you experience any of the following:  difficulty breathing or increased cough     Notify your health care provider if you experience any of the following:  increased confusion or weakness     Activity as tolerated       Significant Diagnostic Studies: N/A    Pending Diagnostic Studies:       None           Medications:  Reconciled Home Medications:      Medication List        START taking these medications      albuterol 90 mcg/actuation inhaler  Commonly known as: VENTOLIN HFA  Inhale 2 puffs into the lungs every 6 (six) hours as needed for Wheezing or Shortness of Breath. Rescue            CONTINUE taking these medications      allopurinoL 300 MG tablet  Commonly known as: ZYLOPRIM  Take 300 mg by mouth once daily.     atorvastatin 20 MG tablet  Commonly known as: LIPITOR  Take 20 mg by mouth once daily.     chlorhexidine 0.12 % solution  Commonly known as: PERIDEX  15 mLs 2 (two) times daily.     clotrimazole-betamethasone 1-0.05% cream  Commonly known as: LOTRISONE  Apply topically 2 (two) times daily as needed.     fluticasone propionate  50 mcg/actuation nasal spray  Commonly known as: FLONASE  2 sprays by Each Nostril route daily as needed.     VASCEPA 1 gram Cap  Generic drug: icosapent ethyL  Take 2 capsules by mouth 2 (two) times daily.            STOP taking these medications      naproxen 500 MG tablet  Commonly known as: NAPROSYN              Indwelling Lines/Drains at time of discharge:   Lines/Drains/Airways       None                   Time spent on the discharge of patient: 35 minutes         Ayana Mtz NP  Department of Hospital Medicine  [facility]

## 2024-04-04 ENCOUNTER — OFFICE VISIT (OUTPATIENT)
Dept: CARDIOLOGY | Facility: CLINIC | Age: 69
End: 2024-04-04
Payer: MEDICARE

## 2024-04-04 VITALS
BODY MASS INDEX: 23.43 KG/M2 | OXYGEN SATURATION: 94 % | DIASTOLIC BLOOD PRESSURE: 64 MMHG | WEIGHT: 137.25 LBS | SYSTOLIC BLOOD PRESSURE: 120 MMHG | HEART RATE: 78 BPM | HEIGHT: 64 IN

## 2024-04-04 DIAGNOSIS — R07.89 OTHER CHEST PAIN: ICD-10-CM

## 2024-04-04 DIAGNOSIS — I77.1 STRICTURE OF ARTERY: Primary | ICD-10-CM

## 2024-04-04 DIAGNOSIS — E78.5 HYPERLIPIDEMIA, UNSPECIFIED HYPERLIPIDEMIA TYPE: ICD-10-CM

## 2024-04-04 PROCEDURE — 99214 OFFICE O/P EST MOD 30 MIN: CPT | Mod: S$GLB,,, | Performed by: INTERNAL MEDICINE

## 2024-04-04 PROCEDURE — 1124F ACP DISCUSS-NO DSCNMKR DOCD: CPT | Mod: CPTII,S$GLB,, | Performed by: INTERNAL MEDICINE

## 2024-04-04 PROCEDURE — 1101F PT FALLS ASSESS-DOCD LE1/YR: CPT | Mod: CPTII,S$GLB,, | Performed by: INTERNAL MEDICINE

## 2024-04-04 PROCEDURE — 3078F DIAST BP <80 MM HG: CPT | Mod: CPTII,S$GLB,, | Performed by: INTERNAL MEDICINE

## 2024-04-04 PROCEDURE — 3008F BODY MASS INDEX DOCD: CPT | Mod: CPTII,S$GLB,, | Performed by: INTERNAL MEDICINE

## 2024-04-04 PROCEDURE — 3074F SYST BP LT 130 MM HG: CPT | Mod: CPTII,S$GLB,, | Performed by: INTERNAL MEDICINE

## 2024-04-04 PROCEDURE — 3288F FALL RISK ASSESSMENT DOCD: CPT | Mod: CPTII,S$GLB,, | Performed by: INTERNAL MEDICINE

## 2024-04-04 PROCEDURE — 1159F MED LIST DOCD IN RCRD: CPT | Mod: CPTII,S$GLB,, | Performed by: INTERNAL MEDICINE

## 2024-04-04 PROCEDURE — 99999 PR PBB SHADOW E&M-EST. PATIENT-LVL III: CPT | Mod: PBBFAC,,, | Performed by: INTERNAL MEDICINE

## 2024-04-04 PROCEDURE — 1126F AMNT PAIN NOTED NONE PRSNT: CPT | Mod: CPTII,S$GLB,, | Performed by: INTERNAL MEDICINE

## 2024-04-04 NOTE — PROGRESS NOTES
Subjective   Patient ID:  Eduardo Jones is a 68 y.o. male who presents for follow-up of Hospital Follow Up      HPI    HLD    Admitted 1/23/24    HPI: Eduardo Jones is a 68-year-old male with significant history for gout, hyperlipidemia, remote smoker quit 20 years ago -40 year pack smoker who drove himself to the ED for non radiating mid sternal chest tightness associated with shortness of breath and palpitation heart rate 120 last night.  For the past 7 days patient has been experience midsternal chest tightness the last about a minute and resolved with rest.  Symptoms lasted longer and more intense last night; thus, drove himself to the ED. he fell in on his right hip while working in the garden yesterday.  He denies hitting his chest or head.  No previous history of MI or stroke.  No family history of MI or stroke.  He has not clear if activity worsened symptoms but did state symptoms resolved with rest. Denies wheezing, recent URI, headaches, dizziness, change in vision,  diaphoresis, orthopnea, PND, abdominal pain, nausea, vomiting, or extremities weakness/numbness. Denies recent sick contacts, travel, or injury. No new physical limitation in the past month.      In ED EKG normal sinus rhythm . Troponin negative x 2. BNP normal  Noted to be 85-88 on room air with activity.  States Right hip and lower back pain.   D dimer negative.  X ray right hip no fractures.  Received maloxx, dexamethasone, albuterol/atrovent neb , protonix in ED.      Currently denies CP or SOB  Denies prior CAD     Stress test 1/23/24    Normal myocardial perfusion scan. There is no evidence of myocardial ischemia or infarction.    The gated perfusion images showed an ejection fraction of 79% post stress.    The ECG portion of the study is negative for ischemia.    The patient reported no chest pain during the stress test.    There were no arrhythmias during stress.    Echo 1/23/24    Left Ventricle: The left ventricle is normal in size.  There is mild concentric hypertrophy. There is hyperdynamic systolic function with a visually estimated ejection fraction of 70 - 75%.    Right Ventricle: Normal right ventricular cavity size. Systolic function is normal.    Aortic Valve: There is mild aortic regurgitation.    Pulmonary Artery: The estimated pulmonary artery systolic pressure is 26 mmHg.    IVC/SVC: Normal venous pressure at 3 mmHg.     4/4/24 Only gets CP when he coughs. Denies exertional CP or SOB  BP controlled    Review of Systems   Constitutional: Negative for decreased appetite.   HENT:  Negative for ear discharge.    Eyes:  Negative for blurred vision.   Endocrine: Negative for polyphagia.   Skin:  Negative for nail changes.   Genitourinary:  Negative for bladder incontinence.   Neurological:  Negative for aphonia.   Psychiatric/Behavioral:  Negative for hallucinations.    Allergic/Immunologic: Negative for hives.          Objective     Physical Exam  Constitutional:       Appearance: He is well-developed.   HENT:      Head: Normocephalic and atraumatic.   Eyes:      Conjunctiva/sclera: Conjunctivae normal.      Pupils: Pupils are equal, round, and reactive to light.   Cardiovascular:      Rate and Rhythm: Normal rate.      Pulses: Intact distal pulses.      Heart sounds: Normal heart sounds.   Pulmonary:      Effort: Pulmonary effort is normal.      Breath sounds: Normal breath sounds.   Abdominal:      General: Bowel sounds are normal.      Palpations: Abdomen is soft.   Musculoskeletal:         General: Normal range of motion.      Cervical back: Normal range of motion and neck supple.   Skin:     General: Skin is warm and dry.   Neurological:      Mental Status: He is alert and oriented to person, place, and time.            Assessment and Plan     1. Stricture of artery    2. Hyperlipidemia, unspecified hyperlipidemia type    3. Other chest pain        Plan:     CP atypical - likely musculoskeletal - recent stress test and echo  ok  Continue Rx for HLD  OV 6 months    Advance Care Planning     Date: 04/04/2024  Patient did not wish or was not able to name a surrogate decision maker or provide an Advance Care Plan.

## 2024-04-17 ENCOUNTER — OFFICE VISIT (OUTPATIENT)
Dept: PULMONOLOGY | Facility: CLINIC | Age: 69
End: 2024-04-17
Payer: MEDICARE

## 2024-04-17 VITALS
BODY MASS INDEX: 23.07 KG/M2 | HEART RATE: 83 BPM | DIASTOLIC BLOOD PRESSURE: 74 MMHG | HEIGHT: 64 IN | WEIGHT: 135.13 LBS | SYSTOLIC BLOOD PRESSURE: 138 MMHG | OXYGEN SATURATION: 96 %

## 2024-04-17 DIAGNOSIS — R06.00 DYSPNEA, UNSPECIFIED TYPE: Primary | ICD-10-CM

## 2024-04-17 DIAGNOSIS — R91.1 LUNG NODULE: ICD-10-CM

## 2024-04-17 DIAGNOSIS — R91.1 SOLITARY PULMONARY NODULE: ICD-10-CM

## 2024-04-17 PROBLEM — J96.01 ACUTE RESPIRATORY FAILURE WITH HYPOXIA: Status: RESOLVED | Noted: 2024-01-23 | Resolved: 2024-04-17

## 2024-04-17 PROCEDURE — 3008F BODY MASS INDEX DOCD: CPT | Mod: CPTII,S$GLB,, | Performed by: INTERNAL MEDICINE

## 2024-04-17 PROCEDURE — 99999 PR PBB SHADOW E&M-EST. PATIENT-LVL III: CPT | Mod: PBBFAC,,, | Performed by: INTERNAL MEDICINE

## 2024-04-17 PROCEDURE — 99204 OFFICE O/P NEW MOD 45 MIN: CPT | Mod: S$GLB,,, | Performed by: INTERNAL MEDICINE

## 2024-04-17 PROCEDURE — 3075F SYST BP GE 130 - 139MM HG: CPT | Mod: CPTII,S$GLB,, | Performed by: INTERNAL MEDICINE

## 2024-04-17 PROCEDURE — 3288F FALL RISK ASSESSMENT DOCD: CPT | Mod: CPTII,S$GLB,, | Performed by: INTERNAL MEDICINE

## 2024-04-17 PROCEDURE — 1126F AMNT PAIN NOTED NONE PRSNT: CPT | Mod: CPTII,S$GLB,, | Performed by: INTERNAL MEDICINE

## 2024-04-17 PROCEDURE — 3078F DIAST BP <80 MM HG: CPT | Mod: CPTII,S$GLB,, | Performed by: INTERNAL MEDICINE

## 2024-04-17 PROCEDURE — 1101F PT FALLS ASSESS-DOCD LE1/YR: CPT | Mod: CPTII,S$GLB,, | Performed by: INTERNAL MEDICINE

## 2024-04-17 NOTE — PROGRESS NOTES
History & Physical  [facility]    SUBJECTIVE:     Chief Complaint:   Dyspnea    History of Present Illness:  Eduardo Jones is a 68 y.o. male who presents for evaluation of dyspnea.    Eduardo Jones is a 68 y.o. male with a history of prior tobacco use who was admitted in 1/2024 for chest tightness and shortness of breath. Reports a fall onto his back with resultant chest pain and shortness of breath prompting ED visit. Was admitted overnight for monitoring.     He underwent cardiac evaluation. During admission, nuclear stress test without evidence of ischemia. TTE with LVEF 70-75%.      He is no longer experiencing dyspnea. He is able to work in his yard without any limitations. Able to walk over a mile without any shortness of breath. Occasionally will get short of breath with strenuous yard work when it's hot outside.      Has an albuterol inhaler he uses PRN, about once a month. Reports some nasal congestion due to seasonal allergies which he manages with zyrtec. Symptoms well controlled with zyrtec. Has a chronic cough usually in the morning and occasionally at night. Takes OTC cough medicine with some relief.     He immigrated to [place] from [place] 50 years ago.    Smoking: quit in 2000, 1 pack per day for about 37 years  Other substances: none  Inhalers: albuterol  Travel: none  Incarceration/homelessness: none  Occupational/environmental exposures: worked in [employer] as a    Pets/hobbies: none  Recent illness: none  B-Symptoms: none  Autoimmune symptoms/features: none  Hx childhood asthma/atopy: none  Family Hx: no history of cancer or lung disease    Review of patient's allergies indicates:  No Known Allergies    Past Medical History:   Diagnosis Date    Hyperlipidemia      Past Surgical History:   Procedure Laterality Date    ANKLE SURGERY Right     fracture     No family history on file.  Social History     Socioeconomic History    Marital status:    Tobacco Use    Smoking  "status: Former     Current packs/day: 0.00     Types: Cigarettes     Quit date: 2000     Years since quittin.3    Smokeless tobacco: Never   Substance and Sexual Activity    Alcohol use: Yes     Comment: sometimes    Drug use: Never     Social Determinants of Health     Financial Resource Strain: Low Risk  (2024)    Overall Financial Resource Strain (CARDIA)     Difficulty of Paying Living Expenses: Not very hard   Food Insecurity: Food Insecurity Present (2024)    Hunger Vital Sign     Worried About Running Out of Food in the Last Year: Sometimes true     Ran Out of Food in the Last Year: Sometimes true   Transportation Needs: No Transportation Needs (2024)    PRAPARE - Transportation     Lack of Transportation (Medical): No     Lack of Transportation (Non-Medical): No   Physical Activity: Sufficiently Active (2024)    Exercise Vital Sign     Days of Exercise per Week: 3 days     Minutes of Exercise per Session: 50 min   Stress: No Stress Concern Present (2024)    [employer] [employer] - Occupational Stress Questionnaire     Feeling of Stress : Only a little   Social Connections: Unknown (2024)    Social Connection and Isolation Panel [NHANES]     Frequency of Communication with Friends and Family: Three times a week     Frequency of Social Gatherings with Friends and Family: Once a week     Active Member of Clubs or Organizations: Yes     Attends Club or Organization Meetings: 1 to 4 times per year     Marital Status:    Housing Stability: Unknown (2024)    Housing Stability Vital Sign     Unable to Pay for Housing in the Last Year: No     Unstable Housing in the Last Year: No     Review of Systems:  No pertinent positives    OBJECTIVE:     Vital Signs  Vitals:    24 1350   BP: 138/74   Pulse: 83   SpO2: 96%   Weight: 61.3 kg (135 lb 2.3 oz)   Height: 5' 4" (1.626 m)     Body mass index is 23.2 kg/m².    Physical Exam:  General: no distress  Eyes:  " conjunctivae/corneas clear  Nose: no discharge  Neck: trachea midline with no masses appreciated  Lungs:  normal respiratory effort, no wheezes, no rales  Heart: regular rate and rhythm and no murmur  Abdomen: non-distended  Extremities: no cyanosis, no edema, no clubbing  Skin: No rashes or lesions. good skin turgor  Neurologic: alert, oriented, thought content appropriate    Laboratory:  Lab Results   Component Value Date    WBC 9.58 01/24/2024    HGB 14.6 01/24/2024    HCT 43.2 01/24/2024    MCV 94 01/24/2024     (L) 01/24/2024     Chest Imaging, My Impression:   Chest CT: small lung nodule. No evidence of parenchymal lung disease.    Diagnostic Results:  TTE reviewed    ASSESSMENT/PLAN:     1) Dyspnea  - Chest CT reviewed. Pt has had no bothersome symptoms since hospitalization. Obtain PFT to evaluate.    2) Lung nodule  - Reviewed with patient. Recommend repeat chest CT in one year.      Rashel Degroot MD  [facility]

## 2024-04-26 ENCOUNTER — HOSPITAL ENCOUNTER (OUTPATIENT)
Dept: PULMONOLOGY | Facility: CLINIC | Age: 69
Discharge: HOME | End: 2024-04-26
Payer: MEDICARE

## 2024-04-26 DIAGNOSIS — R91.1 LUNG NODULE: ICD-10-CM

## 2024-04-26 LAB
DLCO SINGLE BREATH LLN: 14.55
DLCO SINGLE BREATH PRE REF: 60.1 %
DLCO SINGLE BREATH REF: 21.48
DLCOC SBVA LLN: 2.33
DLCOC SBVA REF: 3.77
DLCOC SINGLE BREATH LLN: 14.55
DLCOC SINGLE BREATH REF: 21.48
DLCOCSBVAULN: 5.2
DLCOCSINGLEBREATHULN: 28.41
DLCOSINGLEBREATHULN: 28.41
DLCOVA LLN: 2.33
DLCOVA PRE REF: 72 %
DLCOVA PRE: 2.71 ML/(MIN*MMHG*L) (ref 2.33–5.2)
DLCOVA REF: 3.77
DLCOVAULN: 5.2
ERV LLN: -16449.11
ERV PRE REF: 82.5 %
ERV REF: 0.89
ERVULN: ABNORMAL
FEF 25 75 LLN: 1.13
FEF 25 75 PRE REF: 67 %
FEF 25 75 REF: 2.84
FET100 CHG: -3.7 %
FEV05 LLN: 0.87
FEV05 REF: 2
FEV1 CHG: 11.8 %
FEV1 FVC LLN: 68
FEV1 FVC PRE REF: 98.1 %
FEV1 FVC REF: 80
FEV1 LLN: 1.68
FEV1 PRE REF: 94.7 %
FEV1 REF: 2.34
FEV1 VOL CHG: 0.26
FRCPLETH LLN: 2.29
FRCPLETH PREREF: 89.5 %
FRCPLETH REF: 3.28
FRCPLETHULN: 4.26
FVC CHG: 10.9 %
FVC LLN: 2.16
FVC PRE REF: 96.7 %
FVC REF: 2.94
FVC VOL CHG: 0.31
IVC PRE: 2.84 L (ref 2.16–3.74)
IVC SINGLE BREATH LLN: 2.16
IVC SINGLE BREATH PRE REF: 96.5 %
IVC SINGLE BREATH REF: 2.94
IVCSINGLEBREATHULN: 3.74
LLN IC: -9999997.71
PEF LLN: 5.02
PEF PRE REF: 101.4 %
PEF REF: 7.01
PHYSICIAN COMMENT: ABNORMAL
POST FEF 25 75: 2.29 L/S (ref 1.13–4.55)
POST FET 100: 7.23 SEC
POST FEV1 FVC: 78.63 % (ref 67.59–90.33)
POST FEV1: 2.48 L (ref 1.68–2.97)
POST FEV5: 2.07 L (ref 0.87–3.14)
POST FVC: 3.15 L (ref 2.16–3.74)
POST PEF: 6.49 L/S (ref 5.02–9)
PRE DLCO: 12.91 ML/(MIN*MMHG) (ref 14.55–28.41)
PRE ERV: 0.73 L (ref -16449.11–16450.89)
PRE FEF 25 75: 1.9 L/S (ref 1.13–4.55)
PRE FET 100: 7.51 SEC
PRE FEV05 REF: 91.1 %
PRE FEV1 FVC: 78.02 % (ref 67.59–90.33)
PRE FEV1: 2.22 L (ref 1.68–2.97)
PRE FEV5: 1.82 L (ref 0.87–3.14)
PRE FRC PL: 2.93 L (ref 2.29–4.26)
PRE FVC: 2.85 L (ref 2.16–3.74)
PRE IC: 2.11 L (ref -9999997.71–#######.####)
PRE PEF: 7.11 L/S (ref 5.02–9)
PRE REF IC: 92.2 %
PRE RV: 2.19 L (ref 1.71–3.06)
PRE TLC: 5.04 L (ref 4.55–6.86)
RAW PRE REF: 143.3 %
RAW PRE: 4.38 CMH2O*S/L (ref 3.06–3.06)
RAW REF: 3.06
REF IC: 2.29
RV LLN: 1.71
RV PRE REF: 92.1 %
RV REF: 2.38
RVTLC LLN: 32
RVTLC PRE REF: 106.6 %
RVTLC PRE: 43.55 % (ref 31.89–49.85)
RVTLC REF: 41
RVTLCULN: 50
RVULN: 3.06
SGAW PRE REF: 81.3 %
SGAW PRE: 0.07 1/(CMH2O*S) (ref 0.08–0.08)
SGAW REF: 0.08
TLC LLN: 4.55
TLC PRE REF: 88.3 %
TLC REF: 5.71
TLC ULN: 6.86
ULN IC: ABNORMAL
VA PRE: 4.76 L (ref 5.56–5.56)
VA SINGLE BREATH LLN: 5.56
VA SINGLE BREATH PRE REF: 85.7 %
VA SINGLE BREATH REF: 5.56
VASINGLEBREATHULN: 5.56
VC LLN: 2.16
VC PRE REF: 96.7 %
VC PRE: 2.85 L (ref 2.16–3.74)
VC REF: 2.94
VC ULN: 3.74

## 2024-04-26 PROCEDURE — 94727 GAS DIL/WSHOT DETER LNG VOL: CPT | Mod: S$GLB,,, | Performed by: INTERNAL MEDICINE

## 2024-04-26 PROCEDURE — 94729 DIFFUSING CAPACITY: CPT | Mod: S$GLB,,, | Performed by: INTERNAL MEDICINE

## 2024-04-26 PROCEDURE — 94060 EVALUATION OF WHEEZING: CPT | Mod: S$GLB,,, | Performed by: INTERNAL MEDICINE

## 2024-10-22 ENCOUNTER — PATIENT MESSAGE (OUTPATIENT)
Dept: RESEARCH | Facility: HOSPITAL | Age: 69
End: 2024-10-22
Payer: MEDICARE

## 2024-11-05 ENCOUNTER — PATIENT MESSAGE (OUTPATIENT)
Dept: RESEARCH | Facility: HOSPITAL | Age: 69
End: 2024-11-05
Payer: MEDICARE

## 2024-11-08 ENCOUNTER — HOSPITAL ENCOUNTER (OUTPATIENT)
Dept: RADIOLOGY | Facility: HOSPITAL | Age: 69
Discharge: HOME | End: 2024-11-08

## 2024-11-08 DIAGNOSIS — M25.571 ANKLE PAIN, RIGHT: ICD-10-CM

## 2024-11-08 PROCEDURE — 73721 MRI JNT OF LWR EXTRE W/O DYE: CPT | Mod: TC,RT

## 2024-12-03 ENCOUNTER — PATIENT OUTREACH (OUTPATIENT)
Dept: ADMINISTRATIVE | Facility: HOSPITAL | Age: 69
End: 2024-12-03
Payer: MEDICARE

## 2024-12-03 ENCOUNTER — OFFICE VISIT (OUTPATIENT)
Facility: CLINIC | Age: 69
End: 2024-12-03
Payer: MEDICARE

## 2024-12-03 VITALS
WEIGHT: 137.88 LBS | OXYGEN SATURATION: 97 % | HEIGHT: 64 IN | TEMPERATURE: 98 F | DIASTOLIC BLOOD PRESSURE: 70 MMHG | SYSTOLIC BLOOD PRESSURE: 122 MMHG | BODY MASS INDEX: 23.54 KG/M2 | HEART RATE: 96 BPM | RESPIRATION RATE: 18 BRPM

## 2024-12-03 DIAGNOSIS — Z87.891 HISTORY OF SMOKING 30 OR MORE PACK YEARS: ICD-10-CM

## 2024-12-03 DIAGNOSIS — M10.9 GOUT, UNSPECIFIED CAUSE, UNSPECIFIED CHRONICITY, UNSPECIFIED SITE: ICD-10-CM

## 2024-12-03 DIAGNOSIS — M79.89 PAIN AND SWELLING OF TOE OF RIGHT FOOT: ICD-10-CM

## 2024-12-03 DIAGNOSIS — Z13.84 ENCOUNTER FOR SCREENING FOR DENTAL DISORDER: Primary | ICD-10-CM

## 2024-12-03 DIAGNOSIS — M79.674 PAIN AND SWELLING OF TOE OF RIGHT FOOT: ICD-10-CM

## 2024-12-03 DIAGNOSIS — E78.5 HYPERLIPIDEMIA, UNSPECIFIED HYPERLIPIDEMIA TYPE: ICD-10-CM

## 2024-12-03 DIAGNOSIS — Z00.00 ANNUAL PHYSICAL EXAM: ICD-10-CM

## 2024-12-03 LAB
A1C: 5.5
LIPIDS, TOTAL CHOLESTEROL: 159
Lab: 3.4

## 2024-12-03 PROCEDURE — 1160F RVW MEDS BY RX/DR IN RCRD: CPT | Mod: CPTII,S$GLB,,

## 2024-12-03 PROCEDURE — 3044F HG A1C LEVEL LT 7.0%: CPT | Mod: CPTII,S$GLB,,

## 2024-12-03 PROCEDURE — 1159F MED LIST DOCD IN RCRD: CPT | Mod: CPTII,S$GLB,,

## 2024-12-03 PROCEDURE — 99387 INIT PM E/M NEW PAT 65+ YRS: CPT | Mod: S$GLB,,,

## 2024-12-03 PROCEDURE — 99999 PR PBB SHADOW E&M-EST. PATIENT-LVL IV: CPT | Mod: PBBFAC,,,

## 2024-12-03 PROCEDURE — 99213 OFFICE O/P EST LOW 20 MIN: CPT | Mod: 25,S$GLB,,

## 2024-12-03 PROCEDURE — 1125F AMNT PAIN NOTED PAIN PRSNT: CPT | Mod: CPTII,S$GLB,,

## 2024-12-03 PROCEDURE — 3074F SYST BP LT 130 MM HG: CPT | Mod: CPTII,S$GLB,,

## 2024-12-03 PROCEDURE — 3078F DIAST BP <80 MM HG: CPT | Mod: CPTII,S$GLB,,

## 2024-12-03 PROCEDURE — 3008F BODY MASS INDEX DOCD: CPT | Mod: CPTII,S$GLB,,

## 2024-12-03 RX ORDER — ROSUVASTATIN CALCIUM 40 MG/1
40 TABLET, COATED ORAL NIGHTLY
COMMUNITY

## 2024-12-03 RX ORDER — DICLOFENAC SODIUM 10 MG/G
2 GEL TOPICAL DAILY PRN
Qty: 20 G | Refills: 0 | Status: SHIPPED | OUTPATIENT
Start: 2024-12-03 | End: 2025-01-02

## 2024-12-03 NOTE — LETTER
AUTHORIZATION FOR RELEASE OF   CONFIDENTIAL INFORMATION    Dear Medical records,    We are seeing Eduardo Jones, date of birth 1955, in the clinic at No Department Specified. Ada Lyons MD is the patient's PCP. Eduardo Jones has an outstanding lab/procedure at the time we reviewed his chart. In order to help keep his health information updated, he has authorized us to request the following medical record(s):        (  )  MAMMOGRAM                                      (  )  COLONOSCOPY      (  )  PAP SMEAR                                          (  )  OUTSIDE LAB RESULTS     (  )  DEXA SCAN                                          (  )  EYE EXAM            (  )  FOOT EXAM                                          ( x )  ENTIRE RECORD     (  )  OUTSIDE IMMUNIZATIONS                 (  )  _______________         Please fax records to Ada Lyons MD, [phone]     If you have any questions, please contact Maria Isabel at [phone]          Patient Name: Eduardo Jones  : 1955  Patient Phone #: [phone]

## 2024-12-03 NOTE — PROGRESS NOTES
Health Maintenance Due   Topic Date Due    Abdominal Aortic Aneurysm Screening  Never done     PABLO faxed to Dr Ada Negrete M.D

## 2024-12-03 NOTE — PROGRESS NOTES
SUBJECTIVE     Chief Complaint   Patient presents with    Foot Swelling     Pt is coming in for a referral to see a specialist     Establish Care       HPI  Eduardo Jones is a 69 y.o. male with multiple medical diagnoses as listed in the medical history and problem list that presents for establishment of care. Pt is new to me and to the clinic.    History of Present Illness    CHIEF COMPLAINT:  Mr. Jones presents today to establish care.    MEDICAL HISTORY:  He has a history of hyperlipidemia, hypertensive heart disease, macular degeneration in the right eye, osteoarthritis in the right ankle and foot, gout, and a lung nodule found six months ago.    SURGICAL HISTORY:  He underwent ankle surgery approximately 5-6 years ago and had a closed fracture of the left foot in .    CURRENT SYMPTOMS:  He reports experiencing foot pain and swelling in the right foot, which worsens with walking.    MEDICATIONS:  He is currently taking Vascepa and Crestor 40 mg for cholesterol management, Flonase for allergies, Lotrisone cream for feet as needed, Chlorhexidine for dental purposes as needed, Allopurinol 300 mg daily for gout prevention, and an inhaler as needed (though not recently used).    SOCIAL HISTORY:  He is retired, quit smoking in , and drinks alcohol occasionally.    FAMILY HISTORY:  His mother is alive and does not take any medication. His father  young, around age 33-36. One sister  during a storm while escaping the country.      ROS:  General: -fever, -chills, -fatigue, -weight gain, -weight loss  Eyes: -vision changes, -redness, -discharge  ENT: -ear pain, -nasal congestion, -sore throat  Cardiovascular: -chest pain, -palpitations, -lower extremity edema  Respiratory: -cough, -shortness of breath  Gastrointestinal: -abdominal pain, -nausea, -vomiting, -diarrhea, -constipation, -blood in stool  Genitourinary: -dysuria, -hematuria, -frequency  Musculoskeletal: +joint pain, -muscle pain  Skin: -rash,  -lesion  Neurological: -headache, -dizziness, -numbness, -tingling  Psychiatric: -anxiety, -depression, -sleep difficulty          PAST MEDICAL HISTORY:  Past Medical History:   Diagnosis Date    Hyperlipidemia     Hypertensive heart disease without heart failure     Lung nodule     Presbyopia     Primary osteoarthritis, right ankle and foot     Unspecified macular degeneration        PAST SURGICAL HISTORY:  Past Surgical History:   Procedure Laterality Date    ANKLE SURGERY Right     fracture    left foot fracture          SOCIAL HISTORY:  Social History     Socioeconomic History    Marital status:     Number of children: 5   Occupational History    Occupation: Retired   Tobacco Use    Smoking status: Former     Current packs/day: 0.00     Types: Cigarettes     Quit date:      Years since quittin.9    Smokeless tobacco: Never   Substance and Sexual Activity    Alcohol use: Yes     Comment: sometimes    Drug use: Never    Sexual activity: Not Currently     Partners: Female     Social Drivers of Health     Financial Resource Strain: Low Risk  (2024)    Overall Financial Resource Strain (CARDIA)     Difficulty of Paying Living Expenses: Not very hard   Food Insecurity: Food Insecurity Present (2024)    Hunger Vital Sign     Worried About Running Out of Food in the Last Year: Sometimes true     Ran Out of Food in the Last Year: Sometimes true   Transportation Needs: No Transportation Needs (2024)    PRAPARE - Transportation     Lack of Transportation (Medical): No     Lack of Transportation (Non-Medical): No   Physical Activity: Sufficiently Active (2024)    Exercise Vital Sign     Days of Exercise per Week: 3 days     Minutes of Exercise per Session: 50 min   Stress: No Stress Concern Present (2024)    [employer] [place] [employer] - Occupational Stress Questionnaire     Feeling of Stress : Only a little   Housing Stability: Unknown (2024)    Housing Stability Vital  "Sign     Unable to Pay for Housing in the Last Year: No     Unstable Housing in the Last Year: No       FAMILY HISTORY:  Family History   Problem Relation Name Age of Onset    No Known Problems Mother         ALLERGIES AND MEDICATIONS: updated and reviewed.  Review of patient's allergies indicates:  No Known Allergies  Current Outpatient Medications   Medication Sig Dispense Refill    allopurinoL (ZYLOPRIM) 300 MG tablet Take 300 mg by mouth once daily.      chlorhexidine (PERIDEX) 0.12 % solution 15 mLs 2 (two) times daily. (Patient taking differently: Use as directed 15 mLs in the mouth or throat 2 (two) times daily as needed (ulcers).)      clotrimazole-betamethasone 1-0.05% (LOTRISONE) cream Apply topically 2 (two) times daily as needed.      fluticasone propionate (FLONASE) 50 mcg/actuation nasal spray 2 sprays by Each Nostril route daily as needed.      rosuvastatin (CRESTOR) 40 MG Tab Take 40 mg by mouth every evening.      VASCEPA 1 gram Cap Take 2 capsules by mouth 2 (two) times daily.      albuterol (VENTOLIN HFA) 90 mcg/actuation inhaler Inhale 2 puffs into the lungs every 6 (six) hours as needed for Wheezing or Shortness of Breath. Rescue (Patient not taking: Reported on 12/3/2024) 18 g 0    diclofenac sodium (VOLTAREN ARTHRITIS PAIN) 1 % Gel Apply 2 g topically daily as needed (pain). 20 g 0     No current facility-administered medications for this visit.     OBJECTIVE     Physical Exam  Vitals:    12/03/24 1321   BP: 122/70   Pulse: 96   Resp: 18   Temp: 98 °F (36.7 °C)    Body mass index is 23.67 kg/m².  Weight: 62.5 kg (137 lb 14.4 oz)   Height: 5' 4" (162.6 cm)     Physical Exam  Constitutional:       General: He is not in acute distress.     Appearance: Normal appearance. He is not ill-appearing or diaphoretic.   HENT:      Right Ear: Tympanic membrane normal. There is no impacted cerumen.      Left Ear: Tympanic membrane normal. There is no impacted cerumen.      Nose: Nose normal. No congestion " or rhinorrhea.      Mouth/Throat:      Mouth: Mucous membranes are moist.      Pharynx: Oropharynx is clear. No oropharyngeal exudate or posterior oropharyngeal erythema.   Eyes:      General:         Right eye: No discharge.         Left eye: No discharge.   Cardiovascular:      Rate and Rhythm: Normal rate and regular rhythm.      Pulses: Normal pulses.      Heart sounds: Normal heart sounds.   Pulmonary:      Effort: Pulmonary effort is normal.      Breath sounds: Normal breath sounds.   Abdominal:      General: Bowel sounds are normal. There is no distension.      Palpations: Abdomen is soft.      Tenderness: There is no abdominal tenderness. There is no guarding.   Musculoskeletal:         General: No swelling or tenderness.      Cervical back: Normal range of motion. No rigidity or tenderness.      Right lower leg: No edema.      Left lower leg: No edema.   Skin:     General: Skin is warm and dry.      Findings: No bruising, erythema, lesion or rash.   Neurological:      Mental Status: He is alert and oriented to person, place, and time.      Motor: No weakness.      Gait: Gait normal.   Psychiatric:         Mood and Affect: Mood normal.         Behavior: Behavior normal.         Health Maintenance         Date Due Completion Date    Influenza Vaccine (1) 06/30/2025 (Originally 9/1/2024) 11/2/2023    COVID-19 Vaccine (5 - 2024-25 season) 12/03/2025 (Originally 9/1/2024) 11/2/2023    Pneumococcal Vaccines (Age 65+) (2 of 2 - PPSV23 or PCV20) 12/03/2025 (Originally 11/11/2020) 9/16/2020    Lipid Panel 08/01/2029 8/1/2024    TETANUS VACCINE 04/27/2031 4/27/2021    Colorectal Cancer Screening 12/06/2032 12/6/2022              ASSESSMENT     69 y.o. male with     1. Encounter for screening for dental disorder    2. Pain and swelling of toe of right foot    3. History of smoking 30 or more pack years    4. Hyperlipidemia, unspecified hyperlipidemia type    5. Gout, unspecified cause, unspecified chronicity,  unspecified site    6. Annual physical exam        PLAN:     1. Pain and swelling of toe of right foot  New; treating  - diclofenac sodium (VOLTAREN ARTHRITIS PAIN) 1 % Gel; Apply 2 g topically daily as needed (pain).  Dispense: 20 g; Refill: 0    2. Encounter for screening for dental disorder  Oral health screening completed at today's visit. All questions/concerns addressed.     3. History of smoking 30 or more pack years  New; Addressing  - US Abdominal Aorta; Future    4. Hyperlipidemia, unspecified hyperlipidemia type  New; Assessing, Treating    5. Gout, unspecified cause, unspecified chronicity, unspecified site  New; Stable, Treating     6. Annual physical exam  Counseled on age appropriate medical preventative services, including age appropriate cancer screenings, over all nutritional health, need for a consistent exercise regimen and an over all push towards maintaining a vigorous and active lifestyle.  Counseled on age appropriate vaccines and discussed upcoming health care needs based on age/gender.  Spent time with patient counseling on need for a good patient/provider relationship moving forward.  Discussed use of common OTC medications and supplements.  Discussed common dietary aids and use of caffeine and the need for good sleep hygiene and stress management.      Assessment & Plan    IMPRESSION:  - Reviewed patient's lab results from August 2024, noting good cholesterol, kidney, and liver function  - Assessed foot pain and swelling in right foot, potentially related to osteoarthritis  - Considered screening for abdominal aortic aneurysm due to long smoking history  - Evaluated current medication regimen, including cholesterol and gout management    HYPERCHOLESTEROLEMIA:  - Discussed the significance of elevated HDL cholesterol and its relation to vegetable consumption.  - Continued vascepa capsules for cholesterol management.  - Continued rosuvastatin 40 mg for cholesterol  management.    OSTEOARTHRITIS AND FOOT PAIN:  - Started meloxicam daily for foot inflammation.  - Started diclofenac sodium gel for topical use on foot, not to be used simultaneously with oral meloxicam.  - Continued Lotrisone cream for feet as needed.  - Referred to podiatry for right foot pain.    GOUT:  - Continued allopurinol 300 mg daily for gout management.    CHRONIC OBSTRUCTIVE PULMONARY DISEASE:  - Continued inhaler as needed.    HISTORY OF NICOTINE DEPENDENCE:  - Emphasized the importance of abdominal aortic aneurysm screening for long-term smokers.  - Ordered abdominal ultrasound to screen for abdominal aortic aneurysm.    ALLERGIES AND ORAL CARE:  - Continued Flonase for allergy as needed.  - Continued chlorhexidine oral rinse as needed for mouth sores.    GENERAL HEALTH MONITORING:  - Ordered annual labs including complete blood count, kidney and liver function tests, electrolytes, diabetes screening, cholesterol panel, and thyroid function to be completed in January.    FOLLOW-UP:  - Follow up in 5 weeks (around January 7th) for annual follow-up.  - Contact office if any issues arise before scheduled follow-up.     Cecilio Melendez, MEREDITH, APRN, FNP-BC  [facility]  12/15/2024 1:52 PM    This note was generated with the assistance of ambient listening technology. Verbal consent was obtained by the patient and accompanying visitor(s) for the recording of patient appointment to facilitate this note. I attest to having reviewed and edited the generated note for accuracy, though some syntax or spelling errors may persist. Please contact the author of this note for any clarification.

## 2024-12-06 ENCOUNTER — HOSPITAL ENCOUNTER (OUTPATIENT)
Dept: RADIOLOGY | Facility: HOSPITAL | Age: 69
Discharge: HOME | End: 2024-12-06
Payer: MEDICARE

## 2024-12-06 DIAGNOSIS — Z87.891 HISTORY OF SMOKING 30 OR MORE PACK YEARS: ICD-10-CM

## 2024-12-06 PROCEDURE — 76775 US EXAM ABDO BACK WALL LIM: CPT | Mod: 26,,, | Performed by: RADIOLOGY

## 2024-12-06 PROCEDURE — 76775 US EXAM ABDO BACK WALL LIM: CPT | Mod: TC

## 2024-12-10 ENCOUNTER — PATIENT MESSAGE (OUTPATIENT)
Dept: PULMONOLOGY | Facility: CLINIC | Age: 69
End: 2024-12-10
Payer: MEDICARE

## 2025-01-07 ENCOUNTER — HOSPITAL ENCOUNTER (OUTPATIENT)
Dept: RADIOLOGY | Facility: HOSPITAL | Age: 70
Discharge: HOME | End: 2025-01-07
Attending: INTERNAL MEDICINE
Payer: MEDICARE

## 2025-01-07 DIAGNOSIS — R91.1 SOLITARY PULMONARY NODULE: ICD-10-CM

## 2025-01-07 PROCEDURE — 71250 CT THORAX DX C-: CPT | Mod: TC

## 2025-01-07 PROCEDURE — 71250 CT THORAX DX C-: CPT | Mod: 26,,, | Performed by: STUDENT IN AN ORGANIZED HEALTH CARE EDUCATION/TRAINING PROGRAM

## 2025-01-15 ENCOUNTER — TELEPHONE (OUTPATIENT)
Dept: PULMONOLOGY | Facility: CLINIC | Age: 70
End: 2025-01-15
Payer: MEDICARE

## 2025-01-16 ENCOUNTER — OFFICE VISIT (OUTPATIENT)
Dept: PULMONOLOGY | Facility: CLINIC | Age: 70
End: 2025-01-16
Payer: MEDICARE

## 2025-01-16 VITALS
BODY MASS INDEX: 23.15 KG/M2 | SYSTOLIC BLOOD PRESSURE: 120 MMHG | DIASTOLIC BLOOD PRESSURE: 57 MMHG | HEART RATE: 83 BPM | HEIGHT: 64 IN | OXYGEN SATURATION: 96 % | WEIGHT: 135.63 LBS

## 2025-01-16 DIAGNOSIS — R91.1 LUNG NODULE: Primary | ICD-10-CM

## 2025-01-16 PROCEDURE — 3288F FALL RISK ASSESSMENT DOCD: CPT | Mod: HCNC,CPTII,S$GLB, | Performed by: INTERNAL MEDICINE

## 2025-01-16 PROCEDURE — 99999 PR PBB SHADOW E&M-EST. PATIENT-LVL II: CPT | Mod: PBBFAC,HCNC,, | Performed by: INTERNAL MEDICINE

## 2025-01-16 PROCEDURE — 3078F DIAST BP <80 MM HG: CPT | Mod: HCNC,CPTII,S$GLB, | Performed by: INTERNAL MEDICINE

## 2025-01-16 PROCEDURE — 3074F SYST BP LT 130 MM HG: CPT | Mod: HCNC,CPTII,S$GLB, | Performed by: INTERNAL MEDICINE

## 2025-01-16 PROCEDURE — 99214 OFFICE O/P EST MOD 30 MIN: CPT | Mod: HCNC,S$GLB,, | Performed by: INTERNAL MEDICINE

## 2025-01-16 PROCEDURE — 3008F BODY MASS INDEX DOCD: CPT | Mod: HCNC,CPTII,S$GLB, | Performed by: INTERNAL MEDICINE

## 2025-01-16 PROCEDURE — 1101F PT FALLS ASSESS-DOCD LE1/YR: CPT | Mod: HCNC,CPTII,S$GLB, | Performed by: INTERNAL MEDICINE

## 2025-01-16 NOTE — PROGRESS NOTES
History & Physical  [facility]    SUBJECTIVE:     Chief Complaint:   Dyspnea    History of Present Illness:  Eduardo Jones is a 69 y.o. male who presents for evaluation of dyspnea.    Eduardo Jones is a 68 y.o. male with a history of prior tobacco use who was admitted in 1/2024 for chest tightness and shortness of breath. Reports a fall onto his back with resultant chest pain and shortness of breath prompting ED visit. Was admitted overnight for monitoring.     He underwent cardiac evaluation. During admission, nuclear stress test without evidence of ischemia. TTE with LVEF 70-75%.      He is no longer experiencing dyspnea. He is able to work in his yard without any limitations. Able to walk over a mile without any shortness of breath. Occasionally will get short of breath with strenuous yard work when it's hot outside.      Has an albuterol inhaler he uses PRN, about once a month. Reports some nasal congestion due to seasonal allergies which he manages with zyrtec. Symptoms well controlled with zyrtec. Has a chronic cough usually in the morning and occasionally at night. Takes OTC cough medicine with some relief.     He immigrated to [place] from [place] 50 years ago.    Smoking: quit in 2000, 1 pack per day for about 37 years  Other substances: none  Inhalers: albuterol  Travel: none  Incarceration/homelessness: none  Occupational/environmental exposures: worked in [employer] as a    Pets/hobbies: none  Recent illness: none  B-Symptoms: none  Autoimmune symptoms/features: none  Hx childhood asthma/atopy: none  Family Hx: no history of cancer or lung disease    Review of patient's allergies indicates:  No Known Allergies    Past Medical History:   Diagnosis Date    Hyperlipidemia     Hypertensive heart disease without heart failure     Lung nodule     Presbyopia     Primary osteoarthritis, right ankle and foot     Unspecified macular degeneration      Past Surgical History:   Procedure Laterality  "Date    ANKLE SURGERY Right     fracture    left foot fracture        Family History   Problem Relation Name Age of Onset    No Known Problems Mother       Social History     Socioeconomic History    Marital status:     Number of children: 5   Occupational History    Occupation: Retired   Tobacco Use    Smoking status: Former     Current packs/day: 0.00     Types: Cigarettes     Quit date:      Years since quittin.0    Smokeless tobacco: Never   Substance and Sexual Activity    Alcohol use: Yes     Comment: sometimes    Drug use: Never    Sexual activity: Not Currently     Partners: Female     Social Drivers of Health     Financial Resource Strain: Low Risk  (2024)    Overall Financial Resource Strain (CARDIA)     Difficulty of Paying Living Expenses: Not very hard   Food Insecurity: Food Insecurity Present (2024)    Hunger Vital Sign     Worried About Running Out of Food in the Last Year: Sometimes true     Ran Out of Food in the Last Year: Sometimes true   Transportation Needs: No Transportation Needs (2024)    PRAPARE - Transportation     Lack of Transportation (Medical): No     Lack of Transportation (Non-Medical): No   Physical Activity: Sufficiently Active (2024)    Exercise Vital Sign     Days of Exercise per Week: 3 days     Minutes of Exercise per Session: 50 min   Stress: No Stress Concern Present (2024)    [employer] [place] [employer] - Occupational Stress Questionnaire     Feeling of Stress : Only a little   Housing Stability: Unknown (2024)    Housing Stability Vital Sign     Unable to Pay for Housing in the Last Year: No     Unstable Housing in the Last Year: No     Review of Systems:  No pertinent positives    OBJECTIVE:     Vital Signs  Vitals:    25 1025   BP: (!) 120/57   BP Location: Left arm   Patient Position: Sitting   Pulse: 83   SpO2: 96%   Weight: 61.5 kg (135 lb 9.7 oz)   Height: 5' 4" (1.626 m)     Body mass index is 23.28 " kg/m².    Physical Exam:  General: no distress  Eyes:  conjunctivae/corneas clear  Nose: no discharge  Neck: trachea midline with no masses appreciated  Lungs:  normal respiratory effort, no wheezes, no rales  Heart: regular rate and rhythm and no murmur  Abdomen: non-distended  Extremities: no cyanosis, no edema, no clubbing  Skin: No rashes or lesions. good skin turgor  Neurologic: alert, oriented, thought content appropriate    Laboratory:  Lab Results   Component Value Date    WBC 9.58 01/24/2024    HGB 14.6 01/24/2024    HCT 43.2 01/24/2024    MCV 94 01/24/2024     (L) 01/24/2024     Chest Imaging, My Impression:   Chest CT 1/2025: small lung nodule is stable in comparison to one year ago. No evidence of parenchymal lung disease.    Diagnostic Results:  TTE reviewed    PFT 4/2024: no obstruction, no restriction, DLCO/VA 72%    ASSESSMENT/PLAN:     Lung nodule  - Reviewed imaging with the patient. The nodule is stable. Recommend repeat chest CT in one year. No in-office visit needed next year.    Rashel Degroot MD  [facility]

## 2025-01-23 ENCOUNTER — TELEPHONE (OUTPATIENT)
Dept: FAMILY MEDICINE | Facility: CLINIC | Age: 70
End: 2025-01-23
Payer: MEDICARE

## 2025-01-23 NOTE — TELEPHONE ENCOUNTER
Due to weather our office will be closed on 1/23/2025, all appointments have been cancelled. Someone from our office will contact you to reschedule.

## 2025-01-28 ENCOUNTER — TELEPHONE (OUTPATIENT)
Dept: FAMILY MEDICINE | Facility: CLINIC | Age: 70
End: 2025-01-28
Payer: MEDICARE

## 2025-01-28 ENCOUNTER — OFFICE VISIT (OUTPATIENT)
Dept: FAMILY MEDICINE | Facility: CLINIC | Age: 70
End: 2025-01-28
Payer: MEDICARE

## 2025-01-28 VITALS
BODY MASS INDEX: 23.58 KG/M2 | DIASTOLIC BLOOD PRESSURE: 64 MMHG | SYSTOLIC BLOOD PRESSURE: 112 MMHG | HEART RATE: 85 BPM | WEIGHT: 138.13 LBS | HEIGHT: 64 IN | TEMPERATURE: 98 F | OXYGEN SATURATION: 96 %

## 2025-01-28 DIAGNOSIS — M10.9 GOUT, UNSPECIFIED CAUSE, UNSPECIFIED CHRONICITY, UNSPECIFIED SITE: ICD-10-CM

## 2025-01-28 DIAGNOSIS — H52.4 PRESBYOPIA: ICD-10-CM

## 2025-01-28 DIAGNOSIS — Z00.00 ANNUAL PHYSICAL EXAM: Primary | ICD-10-CM

## 2025-01-28 DIAGNOSIS — E78.5 HYPERLIPIDEMIA, UNSPECIFIED HYPERLIPIDEMIA TYPE: ICD-10-CM

## 2025-01-28 DIAGNOSIS — R91.1 SOLITARY LUNG NODULE: ICD-10-CM

## 2025-01-28 PROCEDURE — 99999 PR PBB SHADOW E&M-EST. PATIENT-LVL IV: CPT | Mod: PBBFAC,HCNC,, | Performed by: FAMILY MEDICINE

## 2025-01-28 NOTE — TELEPHONE ENCOUNTER
Patient called referrals line to schedule his Optometry/Ophthalmology appointment with Dr. Swetha Jansen. Could not find any availabilities at this time. Patient has been placed on the wait list in the meantime.

## 2025-01-28 NOTE — PROGRESS NOTES
"  Physical Exam  /64   Pulse 85   Temp 98.2 °F (36.8 °C) (Oral)   Ht 5' 4" (1.626 m)   Wt 62.6 kg (138 lb 1.9 oz)   SpO2 96%   BMI 23.71 kg/m²      Office Visit    Patient Name: Eduardo Jones    : 1955  MRN: [id number]      Assessment/Plan:  Eduardo Jones is a 69 y.o. male who presents today for :    Annual physical exam  -     Hemoglobin A1C; Future; Expected date: 2025  -     CBC Without Differential; Future; Expected date: 2025  -     Comprehensive Metabolic Panel; Future; Expected date: 2025  -     Prostate Specific Antigen, Diagnostic; Future; Expected date: 2025  -     URIC ACID; Future; Expected date: 2025  HLD  -     Lipid Panel; Future; Expected date: 2025  -anticipatory guidance provided with age appropriate preventative services discussed  -healthy diet and regular physical exercise      -Gout - controlled on daily Allopurinol  -     URIC ACID; Future; Expected date: 2025    -Solitary lung nodule (RML) - stable - last CT chest 2025    Presbyopia  -     Ambulatory referral/consult to Ophthalmology; Future; Expected date: 2025          Follow up PRN      This note was created by combination of typed  and MModal dictation.  Transcription errors may be present.  If there are any questions, please contact me.        ----------------------------------------------------------------------------------------------------------------------      HPI:  Patient Care Team:  Eldon Mtz MD as PCP - General (Family Medicine)  Maria Isabel Armando MA as Care Coordinator (Family Medicine)    Eduardo is a 69 y.o. male with      Patient Active Problem List   Diagnosis    -HLD    Stricture of artery    Lung nodule    Gout       This patient is new to me     Eduardo presents today for:  Annual Exam      He is a new patient to me, and desires to est care as his family also sees me for PCP care.  Health maintenance-wise, patient is due for routine labs. He is up " to date on colon cancer screening. His gout is controlled on Allopurinol.  He also has a Hx of pulmonary that is stable. He denies any cardiovascular complaints today.        Wt Readings from Last 4 Encounters:   01/28/25 62.6 kg (138 lb 1.9 oz)   01/16/25 61.5 kg (135 lb 9.7 oz)   12/03/24 62.5 kg (137 lb 14.4 oz)   04/17/24 61.3 kg (135 lb 2.3 oz)           Additional ROS    CONST: no fever, no activity change  EYES: no vision change.   ENT: no sore throat. No dysphagia.   CV: no CP with exertion  RESP: no SOB  GI: no N/V/diarrhea/constipation  : no urinary concerns  MSK: +right heel pain  SKIN: no new rashes  NEURO: no focal deficits.   PSYCH: no new issues.   ENDOCRINE: no polyuria.         Current Medications  Medications reviewed and updated.       Current Outpatient Medications:     albuterol (VENTOLIN HFA) 90 mcg/actuation inhaler, Inhale 2 puffs into the lungs every 6 (six) hours as needed for Wheezing or Shortness of Breath. Rescue, Disp: 18 g, Rfl: 0    allopurinoL (ZYLOPRIM) 300 MG tablet, Take 300 mg by mouth once daily., Disp: , Rfl:     chlorhexidine (PERIDEX) 0.12 % solution, 15 mLs 2 (two) times daily. (Patient taking differently: Use as directed 15 mLs in the mouth or throat 2 (two) times daily as needed (ulcers).), Disp: , Rfl:     clotrimazole-betamethasone 1-0.05% (LOTRISONE) cream, Apply topically 2 (two) times daily as needed., Disp: , Rfl:     fluticasone propionate (FLONASE) 50 mcg/actuation nasal spray, 2 sprays by Each Nostril route daily as needed., Disp: , Rfl:     rosuvastatin (CRESTOR) 40 MG Tab, Take 40 mg by mouth every evening., Disp: , Rfl:     VASCEPA 1 gram Cap, Take 2 capsules by mouth 2 (two) times daily., Disp: , Rfl:     diclofenac sodium (VOLTAREN ARTHRITIS PAIN) 1 % Gel, Apply 2 g topically daily as needed (pain)., Disp: 20 g, Rfl: 0    Past Surgical History:   Procedure Laterality Date    ANKLE SURGERY Right     fracture    left foot fracture          Family History  "  Problem Relation Name Age of Onset    No Known Problems Mother         Social History     Socioeconomic History    Marital status:     Number of children: 5   Occupational History    Occupation: Retired   Tobacco Use    Smoking status: Former     Current packs/day: 0.00     Types: Cigarettes     Quit date:      Years since quittin.0    Smokeless tobacco: Never   Substance and Sexual Activity    Alcohol use: Yes     Comment: sometimes    Drug use: Never    Sexual activity: Not Currently     Partners: Female     Social Drivers of Health     Financial Resource Strain: Low Risk  (2024)    Overall Financial Resource Strain (CARDIA)     Difficulty of Paying Living Expenses: Not very hard   Food Insecurity: Food Insecurity Present (2024)    Hunger Vital Sign     Worried About Running Out of Food in the Last Year: Sometimes true     Ran Out of Food in the Last Year: Sometimes true   Transportation Needs: No Transportation Needs (2024)    PRAPARE - Transportation     Lack of Transportation (Medical): No     Lack of Transportation (Non-Medical): No   Physical Activity: Sufficiently Active (2024)    Exercise Vital Sign     Days of Exercise per Week: 3 days     Minutes of Exercise per Session: 50 min   Stress: No Stress Concern Present (2024)    [employer] - Occupational Stress Questionnaire     Feeling of Stress : Only a little   Housing Stability: Unknown (2024)    Housing Stability Vital Sign     Unable to Pay for Housing in the Last Year: No     Unstable Housing in the Last Year: No           Allergies   Review of patient's allergies indicates:  No Known Allergies          Review of Systems  See HPI      [unfilled]   112/64   Pulse 85   Temp 98.2 °F (36.8 °C) (Oral)   Ht 5' 4" (1.626 m)   Wt 62.6 kg (138 lb 1.9 oz)   SpO2 96%   BMI 23.71 kg/m²     GEN: NAD, well developed, pleasant  HEENT: NCAT, PERRLA, EOMI, sclera clear, anicteric, bilateral " ear exam wnl, O/P clear, MMM with no lesions  NECK: normal, supple with midline trachea, no LAD, no thyromegaly  LUNGS: CTAB, no w/r/r, no increased work of breathing   HEART: RRR, normal S1 and S2, no m/r/g, no edema  ABD: s/nt/nd, NABS  SKIN: normal turgor, no rashes  PSYCH: AOx3, appropriate mood and affect  MSK: warm/well perfused, normal ROM in all extremities, no c/c/e.  NEURO: normal without focal findings, CN II-XII are grossly intact.  Sensation/strength grossly normal, gait and station normal. Mild TTP over the R heel.

## 2025-02-05 ENCOUNTER — LAB VISIT (OUTPATIENT)
Dept: LAB | Facility: HOSPITAL | Age: 70
End: 2025-02-05
Attending: FAMILY MEDICINE
Payer: MEDICARE

## 2025-02-05 DIAGNOSIS — E78.5 HYPERLIPIDEMIA, UNSPECIFIED HYPERLIPIDEMIA TYPE: ICD-10-CM

## 2025-02-05 DIAGNOSIS — Z00.00 ANNUAL PHYSICAL EXAM: ICD-10-CM

## 2025-02-05 DIAGNOSIS — M10.9 GOUT, UNSPECIFIED CAUSE, UNSPECIFIED CHRONICITY, UNSPECIFIED SITE: ICD-10-CM

## 2025-02-05 LAB
ALBUMIN SERPL BCP-MCNC: 3.6 G/DL (ref 3.5–5.2)
ALP SERPL-CCNC: 81 U/L (ref 40–150)
ALT SERPL W/O P-5'-P-CCNC: 50 U/L (ref 10–44)
ANION GAP SERPL CALC-SCNC: 8 MMOL/L (ref 8–16)
AST SERPL-CCNC: 47 U/L (ref 10–40)
BILIRUB SERPL-MCNC: 0.5 MG/DL (ref 0.1–1)
BUN SERPL-MCNC: 13 MG/DL (ref 8–23)
CALCIUM SERPL-MCNC: 8.5 MG/DL (ref 8.7–10.5)
CHLORIDE SERPL-SCNC: 107 MMOL/L (ref 95–110)
CHOLEST SERPL-MCNC: 176 MG/DL (ref 120–199)
CHOLEST/HDLC SERPL: 1.9 {RATIO} (ref 2–5)
CO2 SERPL-SCNC: 26 MMOL/L (ref 23–29)
COMPLEXED PSA SERPL-MCNC: 0.98 NG/ML (ref 0–4)
CREAT SERPL-MCNC: 0.9 MG/DL (ref 0.5–1.4)
ERYTHROCYTE [DISTWIDTH] IN BLOOD BY AUTOMATED COUNT: 12.1 % (ref 11.5–14.5)
EST. GFR  (NO RACE VARIABLE): >60 ML/MIN/1.73 M^2
ESTIMATED AVG GLUCOSE: 111 MG/DL (ref 68–131)
GLUCOSE SERPL-MCNC: 107 MG/DL (ref 70–110)
HBA1C MFR BLD: 5.5 % (ref 4–5.6)
HCT VFR BLD AUTO: 46.5 % (ref 40–54)
HDLC SERPL-MCNC: 93 MG/DL (ref 40–75)
HDLC SERPL: 52.8 % (ref 20–50)
HGB BLD-MCNC: 15.8 G/DL (ref 14–18)
LDLC SERPL CALC-MCNC: 61.6 MG/DL (ref 63–159)
MCH RBC QN AUTO: 33.7 PG (ref 27–31)
MCHC RBC AUTO-ENTMCNC: 34 G/DL (ref 32–36)
MCV RBC AUTO: 99 FL (ref 82–98)
NONHDLC SERPL-MCNC: 83 MG/DL
PLATELET # BLD AUTO: 112 K/UL (ref 150–450)
PMV BLD AUTO: 9.1 FL (ref 9.2–12.9)
POTASSIUM SERPL-SCNC: 4 MMOL/L (ref 3.5–5.1)
PROT SERPL-MCNC: 7.2 G/DL (ref 6–8.4)
RBC # BLD AUTO: 4.69 M/UL (ref 4.6–6.2)
SODIUM SERPL-SCNC: 141 MMOL/L (ref 136–145)
TRIGL SERPL-MCNC: 107 MG/DL (ref 30–150)
URATE SERPL-MCNC: 3.7 MG/DL (ref 3.4–7)
WBC # BLD AUTO: 6.33 K/UL (ref 3.9–12.7)

## 2025-02-05 PROCEDURE — 85027 COMPLETE CBC AUTOMATED: CPT | Mod: HCNC | Performed by: FAMILY MEDICINE

## 2025-02-05 PROCEDURE — 84550 ASSAY OF BLOOD/URIC ACID: CPT | Mod: HCNC | Performed by: FAMILY MEDICINE

## 2025-02-05 PROCEDURE — 80061 LIPID PANEL: CPT | Mod: HCNC | Performed by: FAMILY MEDICINE

## 2025-02-05 PROCEDURE — 83036 HEMOGLOBIN GLYCOSYLATED A1C: CPT | Mod: HCNC | Performed by: FAMILY MEDICINE

## 2025-02-05 PROCEDURE — 80053 COMPREHEN METABOLIC PANEL: CPT | Mod: HCNC | Performed by: FAMILY MEDICINE

## 2025-02-05 PROCEDURE — 36415 COLL VENOUS BLD VENIPUNCTURE: CPT | Mod: HCNC | Performed by: FAMILY MEDICINE

## 2025-02-05 PROCEDURE — 84153 ASSAY OF PSA TOTAL: CPT | Mod: HCNC | Performed by: FAMILY MEDICINE

## 2025-02-21 DIAGNOSIS — Z00.00 ENCOUNTER FOR MEDICARE ANNUAL WELLNESS EXAM: ICD-10-CM

## 2025-02-27 ENCOUNTER — RESULTS FOLLOW-UP (OUTPATIENT)
Dept: FAMILY MEDICINE | Facility: CLINIC | Age: 70
End: 2025-02-27
Payer: MEDICARE

## 2025-03-12 ENCOUNTER — OFFICE VISIT (OUTPATIENT)
Dept: INTERNAL MEDICINE | Facility: CLINIC | Age: 70
End: 2025-03-12
Payer: MEDICARE

## 2025-03-12 VITALS
HEART RATE: 97 BPM | WEIGHT: 135.13 LBS | OXYGEN SATURATION: 95 % | DIASTOLIC BLOOD PRESSURE: 60 MMHG | SYSTOLIC BLOOD PRESSURE: 114 MMHG | BODY MASS INDEX: 23.07 KG/M2 | HEIGHT: 64 IN

## 2025-03-12 DIAGNOSIS — E78.5 HYPERLIPIDEMIA, UNSPECIFIED HYPERLIPIDEMIA TYPE: ICD-10-CM

## 2025-03-12 DIAGNOSIS — Z00.00 ENCOUNTER FOR MEDICARE ANNUAL WELLNESS EXAM: Primary | ICD-10-CM

## 2025-03-12 DIAGNOSIS — D69.6 THROMBOCYTOPENIA: ICD-10-CM

## 2025-03-12 DIAGNOSIS — I77.1 STRICTURE OF ARTERY: ICD-10-CM

## 2025-03-12 DIAGNOSIS — R91.1 LUNG NODULE: ICD-10-CM

## 2025-03-12 PROBLEM — D69.1 THROMBOCYTASTHENIA: Status: RESOLVED | Noted: 2025-03-12 | Resolved: 2025-03-12

## 2025-03-12 PROBLEM — D69.1 THROMBOCYTASTHENIA: Status: ACTIVE | Noted: 2025-03-12

## 2025-03-12 PROCEDURE — 99999 PR PBB SHADOW E&M-EST. PATIENT-LVL IV: CPT | Mod: PBBFAC,HCNC,, | Performed by: NURSE PRACTITIONER

## 2025-03-12 PROCEDURE — G0439 PPPS, SUBSEQ VISIT: HCPCS | Mod: HCNC,S$GLB,, | Performed by: NURSE PRACTITIONER

## 2025-03-12 PROCEDURE — 3074F SYST BP LT 130 MM HG: CPT | Mod: HCNC,CPTII,S$GLB, | Performed by: NURSE PRACTITIONER

## 2025-03-12 PROCEDURE — 1159F MED LIST DOCD IN RCRD: CPT | Mod: HCNC,CPTII,S$GLB, | Performed by: NURSE PRACTITIONER

## 2025-03-12 PROCEDURE — 1170F FXNL STATUS ASSESSED: CPT | Mod: HCNC,CPTII,S$GLB, | Performed by: NURSE PRACTITIONER

## 2025-03-12 PROCEDURE — 3044F HG A1C LEVEL LT 7.0%: CPT | Mod: HCNC,CPTII,S$GLB, | Performed by: NURSE PRACTITIONER

## 2025-03-12 PROCEDURE — G9919 SCRN ND POS ND PROV OF REC: HCPCS | Mod: HCNC,CPTII,S$GLB, | Performed by: NURSE PRACTITIONER

## 2025-03-12 PROCEDURE — 1126F AMNT PAIN NOTED NONE PRSNT: CPT | Mod: HCNC,CPTII,S$GLB, | Performed by: NURSE PRACTITIONER

## 2025-03-12 PROCEDURE — 3078F DIAST BP <80 MM HG: CPT | Mod: HCNC,CPTII,S$GLB, | Performed by: NURSE PRACTITIONER

## 2025-03-12 PROCEDURE — 1158F ADVNC CARE PLAN TLK DOCD: CPT | Mod: HCNC,CPTII,S$GLB, | Performed by: NURSE PRACTITIONER

## 2025-03-12 PROCEDURE — 1160F RVW MEDS BY RX/DR IN RCRD: CPT | Mod: HCNC,CPTII,S$GLB, | Performed by: NURSE PRACTITIONER

## 2025-03-12 RX ORDER — CHOLECALCIFEROL (VITAMIN D3) 25 MCG
1000 TABLET ORAL DAILY
COMMUNITY

## 2025-03-12 NOTE — PATIENT INSTRUCTIONS
Counseling and Referral of Other Preventative  (Italic type indicates deductible and co-insurance are waived)    Patient Name: Eduardo Jones  Today's Date: 3/12/2025    Health Maintenance       Date Due Completion Date    Influenza Vaccine (1) 06/30/2025 (Originally 9/1/2024) 11/2/2023    COVID-19 Vaccine (5 - 2024-25 season) 12/03/2025 (Originally 9/1/2024) 11/2/2023    Pneumococcal Vaccines (Age 50+) (2 of 2 - PPSV23) 12/03/2025 (Originally 11/11/2020) 9/16/2020    Lipid Panel 02/05/2030 2/5/2025    TETANUS VACCINE 04/27/2031 4/27/2021    Colorectal Cancer Screening 12/06/2032 12/6/2022        No orders of the defined types were placed in this encounter.      The following information is provided to all patients.  This information is to help you find resources for any of the problems found today that may be affecting your health:                  Living healthy guide: www.[facility].[place].gov      Understanding Diabetes: www.diabetes.org      Eating healthy: www.cdc.gov/healthyweight      CDC home safety checklist: www.cdc.gov/steadi/patient.html      Agency on Aging: www.goea.[place].gov      Alcoholics anonymous (AA): www.aa.org      Physical Activity: www.belkis.nih.gov/yh7xduo      Tobacco use: www.quitwithusla.org

## 2025-03-12 NOTE — PROGRESS NOTES
"  Eduardo Jones presented for an initial Medicare AWV today. The following components were reviewed and updated:    Medical history  Family History  Social history  Allergies and Current Medications  Health Risk Assessment  Health Maintenance  Care Team    **See Completed Assessments for Annual Wellness visit with in the encounter summary    The following assessments were completed:  Depression Screening  Cognitive function Screening    Timed Get Up Test  Whisper Test      Opioid documentation:      Patient does not have a current opioid prescription.          Vitals:    03/12/25 1032 03/12/25 1052   BP:  114/60   BP Location:  Left arm   Pulse:  97   SpO2:  95%   Weight: 61.3 kg (135 lb 2.3 oz)    Height: 5' 4" (1.626 m)      Body mass index is 23.2 kg/m².       Physical Exam  Constitutional:       Appearance: Normal appearance.   Cardiovascular:      Rate and Rhythm: Normal rate and regular rhythm.   Pulmonary:      Effort: Pulmonary effort is normal.      Breath sounds: Normal breath sounds.   Musculoskeletal:         General: Normal range of motion.   Skin:     General: Skin is warm and dry.   Neurological:      General: No focal deficit present.      Mental Status: He is alert.   Psychiatric:         Mood and Affect: Mood normal.           Diagnoses and health risks identified today and associated recommendations/orders:  1. Encounter for Medicare annual wellness exam  Here for Health Risk Assessment/Annual Wellness Visit.  Health maintenance reviewed and updated. Follow up in one year.   - Referral to Enhanced Annual Wellness Visit (eAWV) W+1    2. Stricture of artery  Chronic, stable. Followed by PCP    3. Thrombocytopenia  Chronic, stable. Followed by PCP    4. Lung nodule  Chronic, stable. Followed by PCP    5. Hyperlipidemia, unspecified hyperlipidemia type  Chronic, at goal with rosuvastatin. Followed by PCP.      Provided Eduardo with a 5-10 year written screening schedule and personal prevention plan. " Recommendations were developed using the USPSTF age appropriate recommendations. Education, counseling, and referrals were provided as needed.  After Visit Summary printed and given to patient which includes a list of additional screenings\tests needed.    Follow up in about 11 months (around 1/29/2026).with PCP      Lizzy Pathak NP

## 2025-05-26 ENCOUNTER — OFFICE VISIT (OUTPATIENT)
Dept: OPTOMETRY | Facility: CLINIC | Age: 70
End: 2025-05-26
Payer: MEDICARE

## 2025-05-26 DIAGNOSIS — H52.4 PRESBYOPIA: ICD-10-CM

## 2025-05-26 DIAGNOSIS — H35.3210 EXUDATIVE AGE-RELATED MACULAR DEGENERATION OF RIGHT EYE, UNSPECIFIED STAGE: Primary | ICD-10-CM

## 2025-05-26 DIAGNOSIS — Z13.5 GLAUCOMA SCREENING: ICD-10-CM

## 2025-05-26 PROCEDURE — 1160F RVW MEDS BY RX/DR IN RCRD: CPT | Mod: CPTII,S$GLB,, | Performed by: OPTOMETRIST

## 2025-05-26 PROCEDURE — 92004 COMPRE OPH EXAM NEW PT 1/>: CPT | Mod: S$GLB,,, | Performed by: OPTOMETRIST

## 2025-05-26 PROCEDURE — 1159F MED LIST DOCD IN RCRD: CPT | Mod: CPTII,S$GLB,, | Performed by: OPTOMETRIST

## 2025-05-26 PROCEDURE — 92015 DETERMINE REFRACTIVE STATE: CPT | Mod: S$GLB,,, | Performed by: OPTOMETRIST

## 2025-05-26 PROCEDURE — 1101F PT FALLS ASSESS-DOCD LE1/YR: CPT | Mod: CPTII,S$GLB,, | Performed by: OPTOMETRIST

## 2025-05-26 PROCEDURE — 3044F HG A1C LEVEL LT 7.0%: CPT | Mod: CPTII,S$GLB,, | Performed by: OPTOMETRIST

## 2025-05-26 PROCEDURE — 99999 PR PBB SHADOW E&M-EST. PATIENT-LVL III: CPT | Mod: PBBFAC,,, | Performed by: OPTOMETRIST

## 2025-05-26 PROCEDURE — 3288F FALL RISK ASSESSMENT DOCD: CPT | Mod: CPTII,S$GLB,, | Performed by: OPTOMETRIST

## 2025-05-26 RX ORDER — MELOXICAM 7.5 MG/1
7.5 TABLET ORAL 2 TIMES DAILY PRN
COMMUNITY
Start: 2025-03-25

## 2025-05-26 NOTE — PROGRESS NOTES
HPI    Last Exam: 1 Year Ago    Patient is here today due to blurred VA OD. Denies pain, flashes and   floaters. Patient has had PC IOL OU in 2019 and has noticed that VA is   blurred. Patient has a history of injections OD and states that his last   one was done about 2 years ago. States that he also has trouble reading   through his current rx glasses. No other ocular concerns at this time.     PC IOL OU 2019  Refresh PRN OU   Last edited by Martita Felix on 5/26/2025  2:40 PM.            Assessment /Plan     For exam results, see Encounter Report.    Exudative age-related macular degeneration of right eye, unspecified stage    Presbyopia  -     Ambulatory referral/consult to Ophthalmology    Glaucoma screening      Pt here to establish care at [facility] Hx cat surgery OU--pt wishes new spex   ARMD w inj hx OD    PLAN:    Wrote new spex Rx  Retinal consult to determine need for further injections

## 2025-07-03 ENCOUNTER — OFFICE VISIT (OUTPATIENT)
Dept: OPHTHALMOLOGY | Facility: CLINIC | Age: 70
End: 2025-07-03
Payer: MEDICARE

## 2025-07-03 DIAGNOSIS — B39.9 PRESUMED OCULAR HISTOPLASMOSIS SYNDROME (POHS) OF BOTH EYES: ICD-10-CM

## 2025-07-03 DIAGNOSIS — H32 PRESUMED OCULAR HISTOPLASMOSIS SYNDROME (POHS) OF BOTH EYES: ICD-10-CM

## 2025-07-03 DIAGNOSIS — H35.3212 EXUDATIVE AGE-RELATED MACULAR DEGENERATION OF RIGHT EYE WITH INACTIVE CHOROIDAL NEOVASCULARIZATION: ICD-10-CM

## 2025-07-03 DIAGNOSIS — H35.3221 EXUDATIVE AGE-RELATED MACULAR DEGENERATION OF LEFT EYE WITH ACTIVE CHOROIDAL NEOVASCULARIZATION: Primary | ICD-10-CM

## 2025-07-03 PROCEDURE — 99999 PR PBB SHADOW E&M-EST. PATIENT-LVL III: CPT | Mod: PBBFAC,,, | Performed by: OPHTHALMOLOGY

## 2025-07-03 RX ADMIN — Medication 1.25 MG: at 01:07

## 2025-07-03 NOTE — PROGRESS NOTES
HPI     Macular Degeneration     Additional comments: Cons per Dr. Osito Shah           Comments    70 yo male here today with c/o decreasing VA OD over the past 6-7 yrs, h/o   injections OD Eylea, no pain ou, occasional itching ou, and occasional   floaters ou without flashes.    AT ou prn          Last edited by Aury Ahuja on 7/3/2025 11:15 AM.          OCT - CNVM OU  OD stable fibrosis   OS temporal CNVM over PED      A/P    POHN vs Wet AMD OU  OD stablize fibrosis  - prior injections in [place]  OS - CNVM temporal to fovea    Avastin OS today    2. PVD OD    3. VMA OS    4. PCIOL OU      1 month OCT no dilate and Avastin OS    Risks, benefits, and alternatives to treatment discussed in detail with the patient.  The patient voiced understanding and wished to proceed with the procedure    Injection Procedure Note:  Diagnosis: CNVM OS    Patient Identified and Time Out complete  Pt marked  Topical Proparacaine and Betadine.  Inject Avastin OS at 6:00 @ 3.5-4mm posterior to limbus  Post Operative Dx: Same  Complications: None  Follow up as above.

## 2025-08-05 ENCOUNTER — OFFICE VISIT (OUTPATIENT)
Dept: FAMILY MEDICINE | Facility: CLINIC | Age: 70
End: 2025-08-05
Payer: MEDICARE

## 2025-08-05 VITALS
DIASTOLIC BLOOD PRESSURE: 58 MMHG | HEIGHT: 64 IN | HEART RATE: 77 BPM | WEIGHT: 138 LBS | SYSTOLIC BLOOD PRESSURE: 112 MMHG | BODY MASS INDEX: 23.56 KG/M2 | TEMPERATURE: 98 F | OXYGEN SATURATION: 94 %

## 2025-08-05 DIAGNOSIS — L30.8 OTHER ECZEMA: ICD-10-CM

## 2025-08-05 DIAGNOSIS — M10.9 GOUT, UNSPECIFIED CAUSE, UNSPECIFIED CHRONICITY, UNSPECIFIED SITE: Primary | ICD-10-CM

## 2025-08-05 DIAGNOSIS — Z00.00 ANNUAL PHYSICAL EXAM: ICD-10-CM

## 2025-08-05 DIAGNOSIS — E78.5 HYPERLIPIDEMIA, UNSPECIFIED HYPERLIPIDEMIA TYPE: ICD-10-CM

## 2025-08-05 PROCEDURE — 99214 OFFICE O/P EST MOD 30 MIN: CPT | Mod: HCNC,S$GLB,, | Performed by: FAMILY MEDICINE

## 2025-08-05 PROCEDURE — 1159F MED LIST DOCD IN RCRD: CPT | Mod: CPTII,HCNC,S$GLB, | Performed by: FAMILY MEDICINE

## 2025-08-05 PROCEDURE — 1125F AMNT PAIN NOTED PAIN PRSNT: CPT | Mod: CPTII,HCNC,S$GLB, | Performed by: FAMILY MEDICINE

## 2025-08-05 PROCEDURE — 3288F FALL RISK ASSESSMENT DOCD: CPT | Mod: CPTII,HCNC,S$GLB, | Performed by: FAMILY MEDICINE

## 2025-08-05 PROCEDURE — 3044F HG A1C LEVEL LT 7.0%: CPT | Mod: CPTII,HCNC,S$GLB, | Performed by: FAMILY MEDICINE

## 2025-08-05 PROCEDURE — G2211 COMPLEX E/M VISIT ADD ON: HCPCS | Mod: HCNC,S$GLB,, | Performed by: FAMILY MEDICINE

## 2025-08-05 PROCEDURE — 1101F PT FALLS ASSESS-DOCD LE1/YR: CPT | Mod: CPTII,HCNC,S$GLB, | Performed by: FAMILY MEDICINE

## 2025-08-05 PROCEDURE — 3078F DIAST BP <80 MM HG: CPT | Mod: CPTII,HCNC,S$GLB, | Performed by: FAMILY MEDICINE

## 2025-08-05 PROCEDURE — 3008F BODY MASS INDEX DOCD: CPT | Mod: CPTII,HCNC,S$GLB, | Performed by: FAMILY MEDICINE

## 2025-08-05 PROCEDURE — 3074F SYST BP LT 130 MM HG: CPT | Mod: CPTII,HCNC,S$GLB, | Performed by: FAMILY MEDICINE

## 2025-08-05 PROCEDURE — 99999 PR PBB SHADOW E&M-EST. PATIENT-LVL III: CPT | Mod: PBBFAC,HCNC,, | Performed by: FAMILY MEDICINE

## 2025-08-05 PROCEDURE — 1160F RVW MEDS BY RX/DR IN RCRD: CPT | Mod: CPTII,HCNC,S$GLB, | Performed by: FAMILY MEDICINE

## 2025-08-05 RX ORDER — ROSUVASTATIN CALCIUM 40 MG/1
40 TABLET, COATED ORAL NIGHTLY
Qty: 90 TABLET | Refills: 1 | Status: SHIPPED | OUTPATIENT
Start: 2025-08-05

## 2025-08-05 RX ORDER — TRIAMCINOLONE ACETONIDE 1 MG/G
OINTMENT TOPICAL 2 TIMES DAILY
Qty: 80 G | Refills: 3 | Status: SHIPPED | OUTPATIENT
Start: 2025-08-05

## 2025-08-05 RX ORDER — ALLOPURINOL 300 MG/1
300 TABLET ORAL DAILY
Qty: 90 TABLET | Refills: 1 | Status: SHIPPED | OUTPATIENT
Start: 2025-08-05

## 2025-08-05 NOTE — PROGRESS NOTES
"  Physical Exam  BP (!) 112/58   Pulse 77   Temp 98.2 °F (36.8 °C) (Oral)   Ht 5' 4" (1.626 m)   Wt 62.6 kg (138 lb 0.1 oz)   SpO2 (!) 94%   BMI 23.69 kg/m²      Office Visit    Patient Name: Eduardo Jones    : 1955  MRN: [id number]      Assessment/Plan:  Eduardo Jones is a 69 y.o. male who presents today for :    -Gout - well controlled on daily Allopurinol  -     URIC ACID; Future; Expected date: 2025  -     allopurinoL (ZYLOPRIM) 300 MG tablet; Take 1 tablet (300 mg total) by mouth once daily.     -HLD  -     Lipid Panel; Future; Expected date: 2025  -     rosuvastatin (CRESTOR) 40 MG Tab; Take 1 tablet (40 mg total) by mouth every evening.   -continue statin   -low fat/low carb diet/exercise      Eczema  -     triamcinolone acetonide 0.1% (KENALOG) 0.1 % ointment; Apply topically 2 (two) times daily.  Dispense: 80 g; Refill: 3            Follow up PRN      This note was created by combination of typed  and MModal dictation.  Transcription errors may be present.  If there are any questions, please contact me.      ----------------------------------------------------------------------------------------------------------------------      HPI:  Patient Care Team:  Eldon Mtz MD as PCP - General (Family Medicine)  Maria Isabel Armando MA as Care Coordinator (Family Medicine)    Eduardo is a 69 y.o. male with    Problem List[1]    Eduardo presents today for routine check and   Follow-up and Medication Refill    Gout - no recent flare up, doing well on Allopurinol as prescribed        HLD - tolerating statin well without any issues      Eczema on R foot - mild, needs refills on steroid cream      Additional ROS      CONST: no fever, no activity change  EYES: no vision change.   ENT: no sore throat. No dysphagia.   CV: no CP with exertion  RESP: no SOB  GI: no N/V/diarrhea/constipation  : no urinary concerns  MSK: no new myalgias or arthralgias.   SKIN: see HPI  NEURO: no focal deficits. " "  PSYCH: no new issues.   ENDOCRINE: no polyuria.             Problem List[2]    Current Medications  Medications reviewed/updated.     Medications Ordered Prior to Encounter[3]        Past Surgical History:   Procedure Laterality Date    ANKLE SURGERY Right     fracture    left foot fracture          Family History   Problem Relation Name Age of Onset    No Known Problems Mother         Social History[4]          Allergies   Review of patient's allergies indicates:  No Known Allergies          Review of Systems  See HPI      [unfilled]  BP (!) 112/58   Pulse 77   Temp 98.2 °F (36.8 °C) (Oral)   Ht 5' 4" (1.626 m)   Wt 62.6 kg (138 lb 0.1 oz)   SpO2 (!) 94%   BMI 23.69 kg/m²       GEN: NAD, well developed, pleasant  HEENT: NCAT, PERRLA, EOMI, sclera clear, anicteric  NECK: normal, supple  LUNGS: CTAB, no w/r/r, normal respiratory effort  HEART: RRR, normal S1 and S2, no m/r/g, no palpitations, no edema  ABD: s/nt/nd, NABS, no organomegaly  SKIN: warm and dry with normal turgor, +2cm oval patch of eczema on R dorsal foot  PSYCH: AOx3, appropriate mood and affect.   MSK: extremities warm/well perfused, normal ROM in all 4 extremities, no c/c/e.   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   [2]   Patient Active Problem List  Diagnosis    -HLD    Stricture of artery    Lung nodule    Gout    Thrombocytopenia    Exudative age-related macular degeneration of right eye with inactive choroidal neovascularization    Exudative age-related macular degeneration of left eye with active choroidal neovascularization    " 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 Rescue 18 g 0    chlorhexidine (PERIDEX) 0.12 % solution 15 mLs 2 (two) times daily.      clotrimazole-betamethasone 1-0.05% (LOTRISONE) cream Apply topically 2 (two) times daily as needed.      fluticasone propionate (FLONASE) 50 mcg/actuation nasal spray 2 sprays by Each Nostril route daily as needed.      meloxicam (MOBIC) 7.5 MG tablet Take 7.5 mg by mouth 2 (two) times daily as needed.      VASCEPA 1 gram Cap Take 2 capsules by mouth 2 (two) times daily.      vitamin D (VITAMIN D3) 1000 units Tab Take 1,000 Units by mouth once daily.      [DISCONTINUED] allopurinoL (ZYLOPRIM) 300 MG tablet Take 300 mg by mouth once daily.      [DISCONTINUED] rosuvastatin (CRESTOR) 40 MG Tab Take 40 mg by mouth every evening.      diclofenac sodium (VOLTAREN ARTHRITIS PAIN) 1 % Gel Apply 2 g topically daily as needed (pain). 20 g 0     No current facility-administered medications on file prior to visit.   [4]   Social History  Socioeconomic History    Marital status:     Number of children: 5   Occupational History    Occupation: Retired   Tobacco Use    Smoking status: Former     Current packs/day: 0.00     Types: Cigarettes     Quit date: 2000     Years since quittin.6    Smokeless tobacco: Never   Substance and Sexual Activity    Alcohol use: Yes     Comment: 3-4 beers daily    Drug use: Never    Sexual activity: Not Currently     Partners: Female     Social Drivers of Health     Financial Resource Strain: Low Risk  (2024)    Overall Financial Resource Strain (CARDIA)     Difficulty of Paying Living Expenses: Not very hard   Food Insecurity: Food Insecurity Present (2024)    Hunger Vital Sign     Worried  About Running Out of Food in the Last Year: Sometimes true     Ran Out of Food in the Last Year: Sometimes true   Transportation Needs: No Transportation Needs (4/1/2024)    PRAPARE - Transportation     Lack of Transportation (Medical): No     Lack of Transportation (Non-Medical): No   Physical Activity: Sufficiently Active (4/1/2024)    Exercise Vital Sign     Days of Exercise per Week: 3 days     Minutes of Exercise per Session: 50 min   Stress: No Stress Concern Present (4/1/2024)    [employer] [employer] - Occupational Stress Questionnaire     Feeling of Stress : Only a little   Housing Stability: Unknown (4/1/2024)    Housing Stability Vital Sign     Unable to Pay for Housing in the Last Year: No     Unstable Housing in the Last Year: No